# Patient Record
Sex: FEMALE | Race: WHITE | NOT HISPANIC OR LATINO | URBAN - METROPOLITAN AREA
[De-identification: names, ages, dates, MRNs, and addresses within clinical notes are randomized per-mention and may not be internally consistent; named-entity substitution may affect disease eponyms.]

---

## 2023-07-28 PROBLEM — L25.9 CONTACT DERMATITIS: Status: ACTIVE | Noted: 2023-07-28

## 2023-07-28 PROBLEM — R42 LIGHTHEADEDNESS: Status: ACTIVE | Noted: 2023-07-28

## 2023-07-28 PROBLEM — D64.9 ANEMIA: Status: ACTIVE | Noted: 2023-07-28

## 2023-07-28 PROBLEM — L60.9 LONGITUDINAL NAIL RIDGE: Status: ACTIVE | Noted: 2023-07-28

## 2023-07-28 PROBLEM — M26.609 TMJ DYSFUNCTION: Status: ACTIVE | Noted: 2023-07-28

## 2023-07-28 PROBLEM — H66.90 OTITIS MEDIA: Status: ACTIVE | Noted: 2023-07-28

## 2023-07-28 PROBLEM — R21 SKIN RASH: Status: ACTIVE | Noted: 2023-07-28

## 2023-07-28 PROBLEM — R00.0 TACHYCARDIA: Status: ACTIVE | Noted: 2023-07-28

## 2023-07-28 PROBLEM — R42 VERTIGO: Status: ACTIVE | Noted: 2023-07-28

## 2023-07-28 PROBLEM — J11.1 INFLUENZA: Status: ACTIVE | Noted: 2023-07-28

## 2023-07-28 PROBLEM — R51.9 HEADACHE: Status: ACTIVE | Noted: 2023-07-28

## 2023-07-28 PROBLEM — H92.01 EAR PAIN, RIGHT: Status: ACTIVE | Noted: 2023-07-28

## 2023-07-28 PROBLEM — M25.551 HIP PAIN, RIGHT: Status: ACTIVE | Noted: 2023-07-28

## 2023-07-28 PROBLEM — R07.89 CHEST PRESSURE: Status: ACTIVE | Noted: 2023-07-28

## 2023-07-28 PROBLEM — B02.9 SHINGLES: Status: ACTIVE | Noted: 2023-07-28

## 2023-07-28 PROBLEM — N62 MACROMASTIA: Status: ACTIVE | Noted: 2023-07-28

## 2023-07-28 PROBLEM — M54.16 LUMBAR RADICULOPATHY: Status: ACTIVE | Noted: 2023-07-28

## 2023-07-28 PROBLEM — I10 BENIGN ESSENTIAL HYPERTENSION: Status: ACTIVE | Noted: 2023-07-28

## 2023-07-28 PROBLEM — Z86.19 HISTORY OF SHINGLES: Status: ACTIVE | Noted: 2023-07-28

## 2023-07-28 PROBLEM — J30.9 ALLERGIC RHINITIS: Status: ACTIVE | Noted: 2023-07-28

## 2023-07-28 PROBLEM — S29.019A STRAIN OF THORACIC REGION: Status: ACTIVE | Noted: 2023-07-28

## 2023-07-28 PROBLEM — R10.9 ABDOMINAL PAIN: Status: ACTIVE | Noted: 2023-07-28

## 2023-07-28 PROBLEM — G43.909 MIGRAINE, UNSPECIFIED, NOT INTRACTABLE, WITHOUT STATUS MIGRAINOSUS: Status: ACTIVE | Noted: 2023-07-28

## 2023-07-28 PROBLEM — K21.9 GERD (GASTROESOPHAGEAL REFLUX DISEASE): Status: ACTIVE | Noted: 2023-07-28

## 2023-07-28 PROBLEM — R53.83 FATIGUE: Status: ACTIVE | Noted: 2023-07-28

## 2023-07-28 PROBLEM — L91.8 SKIN TAG: Status: ACTIVE | Noted: 2023-07-28

## 2023-07-28 PROBLEM — J01.90 ACUTE SINUSITIS: Status: ACTIVE | Noted: 2023-07-28

## 2023-07-28 RX ORDER — LANSOPRAZOLE 30 MG/1
1 CAPSULE, DELAYED RELEASE ORAL 2 TIMES DAILY
COMMUNITY
Start: 2014-06-13 | End: 2023-07-31 | Stop reason: SDUPTHER

## 2023-07-28 RX ORDER — IBUPROFEN 600 MG/1
600 TABLET ORAL EVERY 4 HOURS PRN
COMMUNITY
Start: 2015-01-27 | End: 2023-10-16 | Stop reason: ALTCHOICE

## 2023-07-28 RX ORDER — GLUCOSAMINE/CHONDR SU A SOD 750-600 MG
1 TABLET ORAL DAILY
COMMUNITY

## 2023-07-31 ENCOUNTER — LAB (OUTPATIENT)
Dept: LAB | Facility: LAB | Age: 53
End: 2023-07-31
Payer: COMMERCIAL

## 2023-07-31 ENCOUNTER — OFFICE VISIT (OUTPATIENT)
Dept: PRIMARY CARE | Facility: CLINIC | Age: 53
End: 2023-07-31
Payer: COMMERCIAL

## 2023-07-31 VITALS
HEART RATE: 74 BPM | TEMPERATURE: 97.4 F | WEIGHT: 281 LBS | DIASTOLIC BLOOD PRESSURE: 84 MMHG | OXYGEN SATURATION: 96 % | BODY MASS INDEX: 44.68 KG/M2 | SYSTOLIC BLOOD PRESSURE: 133 MMHG

## 2023-07-31 DIAGNOSIS — Z00.00 PHYSICAL EXAM: Primary | ICD-10-CM

## 2023-07-31 DIAGNOSIS — J30.1 SEASONAL ALLERGIC RHINITIS DUE TO POLLEN: ICD-10-CM

## 2023-07-31 DIAGNOSIS — R42 VERTIGO: ICD-10-CM

## 2023-07-31 DIAGNOSIS — K21.9 GASTROESOPHAGEAL REFLUX DISEASE WITHOUT ESOPHAGITIS: ICD-10-CM

## 2023-07-31 DIAGNOSIS — S29.019S STRAIN OF THORACIC REGION, SEQUELA: ICD-10-CM

## 2023-07-31 DIAGNOSIS — E55.9 VITAMIN D DEFICIENCY: ICD-10-CM

## 2023-07-31 DIAGNOSIS — Z00.00 PHYSICAL EXAM: ICD-10-CM

## 2023-07-31 DIAGNOSIS — I10 BENIGN ESSENTIAL HYPERTENSION: ICD-10-CM

## 2023-07-31 DIAGNOSIS — N62 MACROMASTIA: ICD-10-CM

## 2023-07-31 LAB
ALANINE AMINOTRANSFERASE (SGPT) (U/L) IN SER/PLAS: 23 U/L (ref 7–45)
ALBUMIN (G/DL) IN SER/PLAS: 4.3 G/DL (ref 3.4–5)
ALKALINE PHOSPHATASE (U/L) IN SER/PLAS: 68 U/L (ref 33–110)
ANION GAP IN SER/PLAS: 11 MMOL/L (ref 10–20)
ASPARTATE AMINOTRANSFERASE (SGOT) (U/L) IN SER/PLAS: 19 U/L (ref 9–39)
BASOPHILS (10*3/UL) IN BLOOD BY AUTOMATED COUNT: 0.01 X10E9/L (ref 0–0.1)
BASOPHILS/100 LEUKOCYTES IN BLOOD BY AUTOMATED COUNT: 0.2 % (ref 0–2)
BILIRUBIN TOTAL (MG/DL) IN SER/PLAS: 0.7 MG/DL (ref 0–1.2)
CALCIDIOL (25 OH VITAMIN D3) (NG/ML) IN SER/PLAS: 60 NG/ML
CALCIUM (MG/DL) IN SER/PLAS: 9 MG/DL (ref 8.6–10.6)
CARBON DIOXIDE, TOTAL (MMOL/L) IN SER/PLAS: 30 MMOL/L (ref 21–32)
CHLORIDE (MMOL/L) IN SER/PLAS: 103 MMOL/L (ref 98–107)
CHOLESTEROL (MG/DL) IN SER/PLAS: 167 MG/DL (ref 0–199)
CHOLESTEROL IN HDL (MG/DL) IN SER/PLAS: 57.2 MG/DL
CHOLESTEROL/HDL RATIO: 2.9
CREATININE (MG/DL) IN SER/PLAS: 0.71 MG/DL (ref 0.5–1.05)
EOSINOPHILS (10*3/UL) IN BLOOD BY AUTOMATED COUNT: 0.21 X10E9/L (ref 0–0.7)
EOSINOPHILS/100 LEUKOCYTES IN BLOOD BY AUTOMATED COUNT: 3.8 % (ref 0–6)
ERYTHROCYTE DISTRIBUTION WIDTH (RATIO) BY AUTOMATED COUNT: 12.1 % (ref 11.5–14.5)
ERYTHROCYTE MEAN CORPUSCULAR HEMOGLOBIN CONCENTRATION (G/DL) BY AUTOMATED: 32.4 G/DL (ref 32–36)
ERYTHROCYTE MEAN CORPUSCULAR VOLUME (FL) BY AUTOMATED COUNT: 89 FL (ref 80–100)
ERYTHROCYTES (10*6/UL) IN BLOOD BY AUTOMATED COUNT: 4.6 X10E12/L (ref 4–5.2)
GFR FEMALE: >90 ML/MIN/1.73M2
GLUCOSE (MG/DL) IN SER/PLAS: 90 MG/DL (ref 74–99)
HEMATOCRIT (%) IN BLOOD BY AUTOMATED COUNT: 41 % (ref 36–46)
HEMOGLOBIN (G/DL) IN BLOOD: 13.3 G/DL (ref 12–16)
IMMATURE GRANULOCYTES/100 LEUKOCYTES IN BLOOD BY AUTOMATED COUNT: 0.2 % (ref 0–0.9)
LDL: 97 MG/DL (ref 0–99)
LEUKOCYTES (10*3/UL) IN BLOOD BY AUTOMATED COUNT: 5.5 X10E9/L (ref 4.4–11.3)
LYMPHOCYTES (10*3/UL) IN BLOOD BY AUTOMATED COUNT: 1.42 X10E9/L (ref 1.2–4.8)
LYMPHOCYTES/100 LEUKOCYTES IN BLOOD BY AUTOMATED COUNT: 25.9 % (ref 13–44)
MONOCYTES (10*3/UL) IN BLOOD BY AUTOMATED COUNT: 0.41 X10E9/L (ref 0.1–1)
MONOCYTES/100 LEUKOCYTES IN BLOOD BY AUTOMATED COUNT: 7.5 % (ref 2–10)
NEUTROPHILS (10*3/UL) IN BLOOD BY AUTOMATED COUNT: 3.42 X10E9/L (ref 1.2–7.7)
NEUTROPHILS/100 LEUKOCYTES IN BLOOD BY AUTOMATED COUNT: 62.4 % (ref 40–80)
NRBC (PER 100 WBCS) BY AUTOMATED COUNT: 0 /100 WBC (ref 0–0)
PLATELETS (10*3/UL) IN BLOOD AUTOMATED COUNT: 228 X10E9/L (ref 150–450)
POTASSIUM (MMOL/L) IN SER/PLAS: 4.3 MMOL/L (ref 3.5–5.3)
PROTEIN TOTAL: 6.2 G/DL (ref 6.4–8.2)
SODIUM (MMOL/L) IN SER/PLAS: 140 MMOL/L (ref 136–145)
THYROTROPIN (MIU/L) IN SER/PLAS BY DETECTION LIMIT <= 0.05 MIU/L: 1.62 MIU/L (ref 0.44–3.98)
TRIGLYCERIDE (MG/DL) IN SER/PLAS: 62 MG/DL (ref 0–149)
UREA NITROGEN (MG/DL) IN SER/PLAS: 13 MG/DL (ref 6–23)
VLDL: 12 MG/DL (ref 0–40)

## 2023-07-31 PROCEDURE — 80061 LIPID PANEL: CPT

## 2023-07-31 PROCEDURE — 1036F TOBACCO NON-USER: CPT | Performed by: FAMILY MEDICINE

## 2023-07-31 PROCEDURE — 84443 ASSAY THYROID STIM HORMONE: CPT

## 2023-07-31 PROCEDURE — 99213 OFFICE O/P EST LOW 20 MIN: CPT | Performed by: FAMILY MEDICINE

## 2023-07-31 PROCEDURE — 80053 COMPREHEN METABOLIC PANEL: CPT

## 2023-07-31 PROCEDURE — 3075F SYST BP GE 130 - 139MM HG: CPT | Performed by: FAMILY MEDICINE

## 2023-07-31 PROCEDURE — 85025 COMPLETE CBC W/AUTO DIFF WBC: CPT

## 2023-07-31 PROCEDURE — 99396 PREV VISIT EST AGE 40-64: CPT | Performed by: FAMILY MEDICINE

## 2023-07-31 PROCEDURE — 82306 VITAMIN D 25 HYDROXY: CPT

## 2023-07-31 PROCEDURE — 3079F DIAST BP 80-89 MM HG: CPT | Performed by: FAMILY MEDICINE

## 2023-07-31 PROCEDURE — 36415 COLL VENOUS BLD VENIPUNCTURE: CPT

## 2023-07-31 RX ORDER — LANSOPRAZOLE 30 MG/1
30 CAPSULE, DELAYED RELEASE ORAL 2 TIMES DAILY
Qty: 180 CAPSULE | Refills: 1 | Status: SHIPPED | OUTPATIENT
Start: 2023-07-31 | End: 2024-01-27

## 2023-07-31 ASSESSMENT — PATIENT HEALTH QUESTIONNAIRE - PHQ9
2. FEELING DOWN, DEPRESSED OR HOPELESS: NOT AT ALL
SUM OF ALL RESPONSES TO PHQ9 QUESTIONS 1 & 2: 0
1. LITTLE INTEREST OR PLEASURE IN DOING THINGS: NOT AT ALL

## 2023-07-31 ASSESSMENT — LIFESTYLE VARIABLES
SKIP TO QUESTIONS 9-10: 0
HOW MANY STANDARD DRINKS CONTAINING ALCOHOL DO YOU HAVE ON A TYPICAL DAY: 1 OR 2
AUDIT-C TOTAL SCORE: 1
HOW OFTEN DO YOU HAVE SIX OR MORE DRINKS ON ONE OCCASION: LESS THAN MONTHLY
HOW OFTEN DO YOU HAVE A DRINK CONTAINING ALCOHOL: NEVER

## 2023-07-31 ASSESSMENT — ENCOUNTER SYMPTOMS
OCCASIONAL FEELINGS OF UNSTEADINESS: 0
LOSS OF SENSATION IN FEET: 0
DEPRESSION: 0

## 2023-07-31 NOTE — PROGRESS NOTES
Subjective   Patient ID: Brittany Fletcher is a 53 y.o. female who presents for Annual Exam (Here for a physical. Hot flashes).    HPI pt is here for CPE    Having hot flashes.  Has had for 6-8 weeks, had in the past and went away.  EFREN 8 years ago.  Sees Dr Price.  Occasionally wakes pt at night.      2 weeks ago got up from floor doing sit ups and room spinning. Daughter gave her orange and crackers and water and felt a little better.  Had walked that morning.  Vertigo now if turns over in bed or if turns head quickly.  No hearing loss.  Some nausea when happens, can control w movement.  Seasonal allergies acting up, Zyrtec daily.  Not using her steroid ns right now.  Discussed sending her to ENT for work up, wants to try daily steroid ns and if nb will call and will refer    Walks daily.  Trying to lose weight but difficult. Saw surgeon and wants breast reduction due to chronic back pain from breasts and surgeon wants pt down to 230#.  Doing due to constant back pain, not getting better    Needs PPI refilled, takes daily or gerd returns        Review of Systems    Objective   /84   Pulse 74   Temp 36.3 °C (97.4 °F)   Wt 127 kg (281 lb)   SpO2 96%   BMI 44.68 kg/m²     Physical Exam  Constitutional:       Appearance: Normal appearance.   HENT:      Head: Normocephalic.      Right Ear: Tympanic membrane normal.      Left Ear: Tympanic membrane normal.      Nose: Nose normal.      Mouth/Throat:      Comments: PND only, no erythema  Eyes:      Extraocular Movements: Extraocular movements intact.      Conjunctiva/sclera: Conjunctivae normal.      Pupils: Pupils are equal, round, and reactive to light.   Neck:      Vascular: No carotid bruit.   Cardiovascular:      Rate and Rhythm: Normal rate and regular rhythm.      Pulses: Normal pulses.      Heart sounds: Normal heart sounds.   Pulmonary:      Effort: Pulmonary effort is normal.      Breath sounds: Normal breath sounds.   Abdominal:      General: Bowel  sounds are normal.      Palpations: Abdomen is soft. There is no mass.      Tenderness: There is no abdominal tenderness.   Musculoskeletal:         General: Normal range of motion.      Cervical back: Normal range of motion and neck supple.      Right lower leg: No edema.      Left lower leg: No edema.   Skin:     General: Skin is warm and dry.   Neurological:      General: No focal deficit present.      Mental Status: She is alert and oriented to person, place, and time.   Psychiatric:         Mood and Affect: Mood normal.         Thought Content: Thought content normal.         Judgment: Judgment normal.         Assessment/Plan   Problem List Items Addressed This Visit       Allergic rhinitis    Benign essential hypertension    GERD (gastroesophageal reflux disease)    Relevant Medications    lansoprazole (Prevacid) 30 mg DR capsule    Macromastia    Strain of thoracic region    Vertigo     Other Visit Diagnoses       Physical exam    -  Primary    Relevant Orders    CBC and Auto Differential (Completed)    Comprehensive Metabolic Panel (Completed)    Lipid Panel (Completed)    TSH with reflex to Free T4 if abnormal (Completed)    Vitamin D deficiency        Relevant Orders    Vitamin D, Total (Completed)        Refilled patient's medication, PPI, no changes made.  Discussed antireflux precautions also.    Discussed exercise and weight loss to help patient reach her goals so she can get breast reduction surgery.    Ordered labs for patient's physical exam, patient will do fasting we will call with results.    Discussed hot flashes.  Discussed wearing cotton to bed using fans for circulation and bedroom.  Discussed trying Remifemin over-the-counter for 6 to 8 weeks to see if this helps.  Can also try Effexor XR low-dose and may help with her symptoms.  She will let me know.  She is also due to see Dr. Price/GYN and will discuss further with him.  She is due for mammogram and Dr. Price and generally orders this  for her.    Discussed vertigo, patient does not wish to undergo work-up at this time since she feels symptoms are getting better.  Advised her to use steroid nasal spray daily with her Zyrtec to control allergy symptoms.  If vertigo is not resolved in the next 2 to 3 weeks she will call and I will refer her to ENT.  Discussed may need vestibular therapy or other intervention if is not resolving on her own.  She will call sooner if symptoms worsen or change.    Otherwise follow-up in 6 to 12 months or sooner as needed

## 2023-10-16 ENCOUNTER — OFFICE VISIT (OUTPATIENT)
Dept: PRIMARY CARE | Facility: CLINIC | Age: 53
End: 2023-10-16
Payer: COMMERCIAL

## 2023-10-16 ENCOUNTER — ANCILLARY PROCEDURE (OUTPATIENT)
Dept: RADIOLOGY | Facility: CLINIC | Age: 53
End: 2023-10-16
Payer: COMMERCIAL

## 2023-10-16 VITALS
BODY MASS INDEX: 44.42 KG/M2 | HEART RATE: 81 BPM | OXYGEN SATURATION: 96 % | TEMPERATURE: 97.6 F | SYSTOLIC BLOOD PRESSURE: 135 MMHG | WEIGHT: 283 LBS | HEIGHT: 67 IN | DIASTOLIC BLOOD PRESSURE: 84 MMHG

## 2023-10-16 DIAGNOSIS — M54.41 ACUTE RIGHT-SIDED LOW BACK PAIN WITH RIGHT-SIDED SCIATICA: Primary | ICD-10-CM

## 2023-10-16 DIAGNOSIS — M54.41 ACUTE RIGHT-SIDED LOW BACK PAIN WITH RIGHT-SIDED SCIATICA: ICD-10-CM

## 2023-10-16 PROCEDURE — 99214 OFFICE O/P EST MOD 30 MIN: CPT | Performed by: FAMILY MEDICINE

## 2023-10-16 PROCEDURE — 3079F DIAST BP 80-89 MM HG: CPT | Performed by: FAMILY MEDICINE

## 2023-10-16 PROCEDURE — 72110 X-RAY EXAM L-2 SPINE 4/>VWS: CPT | Mod: FY

## 2023-10-16 PROCEDURE — 72110 X-RAY EXAM L-2 SPINE 4/>VWS: CPT | Performed by: RADIOLOGY

## 2023-10-16 PROCEDURE — 3075F SYST BP GE 130 - 139MM HG: CPT | Performed by: FAMILY MEDICINE

## 2023-10-16 PROCEDURE — 1036F TOBACCO NON-USER: CPT | Performed by: FAMILY MEDICINE

## 2023-10-16 RX ORDER — MELOXICAM 15 MG/1
15 TABLET ORAL DAILY
Qty: 30 TABLET | Refills: 0 | Status: SHIPPED | OUTPATIENT
Start: 2023-10-16 | End: 2023-11-15

## 2023-10-16 RX ORDER — CYCLOBENZAPRINE HCL 10 MG
10 TABLET ORAL DAILY PRN
Qty: 30 TABLET | Refills: 0 | Status: SHIPPED | OUTPATIENT
Start: 2023-10-16 | End: 2024-05-03 | Stop reason: WASHOUT

## 2023-10-16 ASSESSMENT — LIFESTYLE VARIABLES
HOW OFTEN DO YOU HAVE SIX OR MORE DRINKS ON ONE OCCASION: LESS THAN MONTHLY
HOW MANY STANDARD DRINKS CONTAINING ALCOHOL DO YOU HAVE ON A TYPICAL DAY: 1 OR 2
SKIP TO QUESTIONS 9-10: 0
AUDIT-C TOTAL SCORE: 2
HOW OFTEN DO YOU HAVE A DRINK CONTAINING ALCOHOL: MONTHLY OR LESS

## 2023-10-16 ASSESSMENT — PATIENT HEALTH QUESTIONNAIRE - PHQ9
SUM OF ALL RESPONSES TO PHQ9 QUESTIONS 1 & 2: 0
2. FEELING DOWN, DEPRESSED OR HOPELESS: NOT AT ALL
1. LITTLE INTEREST OR PLEASURE IN DOING THINGS: NOT AT ALL

## 2023-10-16 ASSESSMENT — ENCOUNTER SYMPTOMS
LOSS OF SENSATION IN FEET: 0
DEPRESSION: 0
OCCASIONAL FEELINGS OF UNSTEADINESS: 0

## 2023-10-16 NOTE — PROGRESS NOTES
"Subjective   Patient ID: Brittany Fletcher is a 53 y.o. female who presents for Back Pain (Back pain radiating from to front to back  of legs. Upper back pain 1 week ).    HPI pt has low back pain on R into R gluteal area and radiates around her R hip down the front of her leg to mid tibial area.  Pain wakes her up at night.  Has had 1 week.  No injury or new activity.  No N/T or bowel or bladder problems.  Never had before, has pain in upper back off and on for years.      Has been using Biofreeze, Lidocaine patch, alt heat and ice, hot tub etc.  Taking 2 Aleve q am and 2 motrin at night.  Pain not going away    Had xray LS spine 2/2019 with mild narrowing of L3 and L4 discs and facet arthrosis L4-L5 bilaterally    She had been walking daily for exercise prior to this starting, now more difficult    Review of Systems    Objective   /84   Pulse 81   Temp 36.4 °C (97.6 °F)   Ht 1.702 m (5' 7\")   Wt 128 kg (283 lb)   SpO2 96%   BMI 44.32 kg/m²     Physical Exam  Cardiovascular:      Rate and Rhythm: Normal rate and regular rhythm.      Pulses: Normal pulses.      Heart sounds: Normal heart sounds.   Pulmonary:      Effort: Pulmonary effort is normal.      Breath sounds: Normal breath sounds.   Musculoskeletal:      Cervical back: Normal range of motion.      Right lower leg: No edema.      Left lower leg: No edema.      Comments: Decreased flexion LS spine.  Sacrum L posterior, PVM w palpable spasm and tenderness.  Straight leg raising + on R.  No sensory or vasc deficit in lower extremities.     Skin:     General: Skin is warm and dry.   Neurological:      Mental Status: She is alert and oriented to person, place, and time.   Psychiatric:         Mood and Affect: Mood normal.         Thought Content: Thought content normal.         Judgment: Judgment normal.         Assessment/Plan   Problem List Items Addressed This Visit    None  Visit Diagnoses         Codes    Acute right-sided low back pain with " right-sided sciatica    -  Primary M54.41    Relevant Medications    cyclobenzaprine (Flexeril) 10 mg tablet    meloxicam (Mobic) 15 mg tablet    Other Relevant Orders    XR lumbar spine complete 4+ views        Will xray LS spine and call w results.  Pending xray results will refer to PT.  Discussed if PT not resolving s/s would need to get mri to assess further.    Will stop otc nsaids and start meloxicam 15 mg w meal and cyclobenzarine 10 mg 1/2 to 1 tab at bed time.  SE profile and usage of both meds discussed in detail

## 2023-10-26 DIAGNOSIS — M54.41 ACUTE RIGHT-SIDED LOW BACK PAIN WITH RIGHT-SIDED SCIATICA: Primary | ICD-10-CM

## 2023-11-27 ENCOUNTER — OFFICE VISIT (OUTPATIENT)
Dept: OBSTETRICS AND GYNECOLOGY | Facility: CLINIC | Age: 53
End: 2023-11-27
Payer: COMMERCIAL

## 2023-11-27 ENCOUNTER — EVALUATION (OUTPATIENT)
Dept: PHYSICAL THERAPY | Facility: CLINIC | Age: 53
End: 2023-11-27
Payer: COMMERCIAL

## 2023-11-27 VITALS
DIASTOLIC BLOOD PRESSURE: 82 MMHG | HEIGHT: 61 IN | WEIGHT: 281 LBS | BODY MASS INDEX: 53.05 KG/M2 | SYSTOLIC BLOOD PRESSURE: 130 MMHG

## 2023-11-27 DIAGNOSIS — Z12.31 VISIT FOR SCREENING MAMMOGRAM: ICD-10-CM

## 2023-11-27 DIAGNOSIS — Z01.419 ENCOUNTER FOR ANNUAL ROUTINE GYNECOLOGICAL EXAMINATION: Primary | ICD-10-CM

## 2023-11-27 DIAGNOSIS — M54.41 ACUTE RIGHT-SIDED LOW BACK PAIN WITH RIGHT-SIDED SCIATICA: Primary | ICD-10-CM

## 2023-11-27 LAB
POC BLOOD, URINE: NEGATIVE
POC GLUCOSE, URINE: NEGATIVE MG/DL
POC KETONES, URINE: NEGATIVE MG/DL
POC LEUKOCYTES, URINE: NEGATIVE
POC NITRITE,URINE: NEGATIVE
POC PROTEIN, URINE: NEGATIVE MG/DL

## 2023-11-27 PROCEDURE — 3075F SYST BP GE 130 - 139MM HG: CPT | Performed by: OBSTETRICS & GYNECOLOGY

## 2023-11-27 PROCEDURE — 99396 PREV VISIT EST AGE 40-64: CPT | Performed by: OBSTETRICS & GYNECOLOGY

## 2023-11-27 PROCEDURE — 97110 THERAPEUTIC EXERCISES: CPT | Mod: GP | Performed by: PHYSICAL THERAPIST

## 2023-11-27 PROCEDURE — 1036F TOBACCO NON-USER: CPT | Performed by: OBSTETRICS & GYNECOLOGY

## 2023-11-27 PROCEDURE — 81003 URINALYSIS AUTO W/O SCOPE: CPT | Performed by: OBSTETRICS & GYNECOLOGY

## 2023-11-27 PROCEDURE — 3079F DIAST BP 80-89 MM HG: CPT | Performed by: OBSTETRICS & GYNECOLOGY

## 2023-11-27 PROCEDURE — 97161 PT EVAL LOW COMPLEX 20 MIN: CPT | Mod: GP | Performed by: PHYSICAL THERAPIST

## 2023-11-27 ASSESSMENT — ENCOUNTER SYMPTOMS
LOSS OF SENSATION IN FEET: 0
ALLERGIC/IMMUNOLOGIC NEGATIVE: 0
HEMATOLOGIC/LYMPHATIC NEGATIVE: 0
PSYCHIATRIC NEGATIVE: 0
GASTROINTESTINAL NEGATIVE: 0
DEPRESSION: 0
RESPIRATORY NEGATIVE: 0
OCCASIONAL FEELINGS OF UNSTEADINESS: 0
CONSTITUTIONAL NEGATIVE: 0
LOSS OF SENSATION IN FEET: 0
DEPRESSION: 0
NEUROLOGICAL NEGATIVE: 0
EYES NEGATIVE: 0
CARDIOVASCULAR NEGATIVE: 0
OCCASIONAL FEELINGS OF UNSTEADINESS: 0
MUSCULOSKELETAL NEGATIVE: 0
ENDOCRINE NEGATIVE: 0

## 2023-11-27 ASSESSMENT — PATIENT HEALTH QUESTIONNAIRE - PHQ9
1. LITTLE INTEREST OR PLEASURE IN DOING THINGS: NOT AT ALL
SUM OF ALL RESPONSES TO PHQ9 QUESTIONS 1 AND 2: 0
1. LITTLE INTEREST OR PLEASURE IN DOING THINGS: NOT AT ALL
2. FEELING DOWN, DEPRESSED OR HOPELESS: NOT AT ALL
2. FEELING DOWN, DEPRESSED OR HOPELESS: NOT AT ALL
SUM OF ALL RESPONSES TO PHQ9 QUESTIONS 1 AND 2: 0

## 2023-11-27 ASSESSMENT — PAIN SCALES - GENERAL: PAINLEVEL: 0-NO PAIN

## 2023-11-27 NOTE — PROGRESS NOTES
Subjective   Patient ID: Brittany Fletcher is a 53 y.o. female who presents for Gynecologic Exam (Annual exam, last pap 14 wnl HPV negative, last mammogram 21 benign, no chaperone needed).  Gynecologic Exam      Brittany is a 53-year-old female  3 para 2 well-known to the practice here for her annual exam    She had a robotic hysterectomy done .  Happy with those results.  Now with menopausal symptoms of hot flashes.  They come and go every few weeks.  Gets a few.  Occasionally interrupts her sleep but not a big problem.  Not looking for treatment at this time.    Denies any vaginal symptoms or dyspareunia.    Denies any urinary or bowel symptoms.  Review of Systems   All other systems reviewed and are negative.      Objective   Physical Exam  Thyroid: No thyroid megaly    Cardiovascular: Regular rate and rhythm    Lungs: Clear to auscultation    Breasts: No skin changes no masses palpated    Abdomen: Soft nontender bowel sounds positive no masses palpated    Extremities nontender no edema    Pelvic exam: External genitalia Bartholin's urethra and [place]'s are normal.  Vaginal exam shows no lesions or discharge.  Pelvic bimanual exam reveals no masses or tenderness.  Assessment/Plan   Normal annual exam.  Post hysterectomy and now postmenopausal  Mild symptoms that she does not desire treatment at this time.  Mammogram ordered  Pap smear is completed  Follow-up in 1 year or as needed

## 2023-11-27 NOTE — PROGRESS NOTES
Physical Therapy    Physical Therapy Evaluation     Patient Name: Brittany Fletcher  MRN: [id number]  Today's Date: 11/27/2023  Time Calculation  Start Time: 0945  Stop Time: 1028  Time Calculation (min): 43 min    Visit #: 1  Visits Approved: 20  Authorization needed: No  Insurance info: 25 COPAY, 750 DED, 2500 OOP MAX     Assessment:   Pt is a 53 y.o. female c/o R mid-low back pain that radiates to R posterior glute and wraps to anterior thigh stopping at R knee that started in October 2023. Pt presents with decreased BLE glute strength, decreased BLE flexibility, decreased SLS balance on RLE, limitations in lumbar AROM, and +TTP on R low back and glutes. These impairments are affecting her ability to lift > 10# at work and sleeping well. Pt is appropriate for skilled PT services to address these impairments and return to PLOF.    Plan:  OP PT Plan  Treatment/Interventions: Biofeedback, Dry needling, Education/ Instruction, Electrical stimulation, Gait training, Hot pack, Manual therapy, Mechanical traction, Neuromuscular re-education, Self care/ home management, Taping techniques, Therapeutic activities, Therapeutic exercises, Ultrasound, Vasopneumatic device  PT Plan: Skilled PT  PT Frequency: 2 times per week  Duration: 4 weeks (then 1x for 4 additional weeks PRN)  Rehab Potential: Excellent  Plan of Care Agreement: Patient    Goals:  Pt will achieve the following goals, in 8 weeks:  Pt will be independent in HEP to maximize PT benefits.   Pt will improve BLE glute max and med strength to 5/5 to improve functional mobility for squatting, walking, and stooping.   Pt will engage TA without cueing to improve core stability when lifting heavy objects at work.  Pt will improve JO score by 12% to demonstrate reduced disability.   Pt will improve average and worst pain rating by >/= 2 pts to improve QoL.  Pt will be able to sleep >/= 7 hrs pain free to improve restful sleep and productivity during the day.     Current  Problem:   1. Acute right-sided low back pain with right-sided sciatica  Referral to Physical Therapy    Follow Up In Physical Therapy          Subjective    Precautions:  No fall risk   Pain:  3/10 LBP  General:   Chief complaint: R sided mid to low back pain that radiates to R glute and anterior thigh (80% of the time) and stops at R knee. N/T intermittently.  HPI: Hx of mid to upper back pain and B knee pain. R knee pain has worsened since recent flare-up.   Description/Location: Spasm in back that won't stop at night. Intermittently sharp   Onset: October 2023   JALIL: Unknown   Pain: 3/10 current, 12/10 worst, 0/10 best   Exaggerating factors: prolonged sitting, any lifting (tries not to lift > 10#)  Relieving factors: ice, heat, lidocaine patches, biofreeze   PLOF: Keeps doing ADLs and anything at work, but feels she is moving slower   Sleep: Will wake her up in the middle of night, but can fall asleep without issues   Occupation: [employer]   Home setup (stairs, laundry): split level, 3-4 stairs to get to kitchen/laundry, 7-8 stairs to get to bedroom and bathroom. Modifies for laundry and requires some assistance from teenage children   Exercise routine: Walks dog everyday, some hip and arm strengthening, chair sit ups   Goals: Reduce pain and know how to manage episodes     Objective   Posture:  Forward head, B rounded shoulders, & increased lumbar lordosis     Neuro Screen:  Dermatomes: Intact to light touch B.    Lumbar AROM:   Flex: Min limitation d/t HS tightness   Ext: Min limitation  RSB: Min limitation  LSB: Min limitation    SLS:  R: asymmetrical pelvis drop initially but self corrected. Increased difficulty with balance on RLE. > 10 sec   L: symmetrical pelvis > 10 sec     Flexibility:  Hamstrings: R severe limitations, L mod-severe limitations   Hip ER: R mod limitations, L min limitations  Hip IR: R WNL, L WNL  Quads: R min limitations, L min limitations    Strength:   Hip  flex: R 4+/5 (painful R knee), L 5/5  Hip ABD: R 4-/5 (painful glute and low back), L 4/5  Hip ext: R 4-/5, L 4/5  Knee ext: R 5/5 (painful R knee), L 5/5  Knee flex: R 5/5, L 5/5  Ankle DF: R 5/5, L 5/5    Palpation:  +TTP: R glute max and med, greater trochanter, lateral thigh to knee, and R SIJ.    Directional Preference:  Repeated lumbar ext performed with prop on elbow and repeated prone press ups - no change in sx.     Passive accessory mvmts:  PA glide thoracic and lumbar spine hypomobile, but not painful    Isometric abdominal contraction:  Diaphragm compensation and holding breath in HL     Outcome Measures:  Other Measures  Oswestry Disablity Index (JO): 30%     Treatments:  1. Initial evaluation   2. Pt ed on diagnosis, prognosis, goals of PT, expectations, POC   3. HEP (see below) ed on normal vs abnormal response    Therapeutic Exercise:  Access Code: [email or web address]  URL: [email or web address]  Date: 11/27/2023  Prepared by: Rosa Palma    Exercises  - Seated Hamstring Stretch  - 2 x daily - 7 x weekly - 1 sets - 3 reps - 20 hold  - Hooklying Transversus Abdominis Palpation  - 2 x daily - 7 x weekly - 1 sets - 10 reps - 5 hold  - Supine Lower Trunk Rotation  - 2 x daily - 7 x weekly - 2 sets - 10 reps - 5 hold  - Supine Bridge  - 2 x daily - 7 x weekly - 2 sets - 10 reps - 5 hold    Education:  Pt educated on importance of core and hip strength for spinal stability to relieve back pain.

## 2023-11-30 ENCOUNTER — TREATMENT (OUTPATIENT)
Dept: PHYSICAL THERAPY | Facility: CLINIC | Age: 53
End: 2023-11-30
Payer: COMMERCIAL

## 2023-11-30 ENCOUNTER — ANCILLARY PROCEDURE (OUTPATIENT)
Dept: RADIOLOGY | Facility: CLINIC | Age: 53
End: 2023-11-30
Payer: COMMERCIAL

## 2023-11-30 VITALS — HEIGHT: 61 IN | BODY MASS INDEX: 52.86 KG/M2 | WEIGHT: 279.98 LBS

## 2023-11-30 DIAGNOSIS — M54.41 ACUTE RIGHT-SIDED LOW BACK PAIN WITH RIGHT-SIDED SCIATICA: ICD-10-CM

## 2023-11-30 DIAGNOSIS — Z12.31 VISIT FOR SCREENING MAMMOGRAM: ICD-10-CM

## 2023-11-30 PROCEDURE — 97110 THERAPEUTIC EXERCISES: CPT | Mod: GP | Performed by: PHYSICAL THERAPIST

## 2023-11-30 PROCEDURE — 77067 SCR MAMMO BI INCL CAD: CPT | Performed by: RADIOLOGY

## 2023-11-30 PROCEDURE — 77063 BREAST TOMOSYNTHESIS BI: CPT | Performed by: RADIOLOGY

## 2023-11-30 PROCEDURE — 77067 SCR MAMMO BI INCL CAD: CPT

## 2023-11-30 ASSESSMENT — PAIN SCALES - GENERAL: PAINLEVEL_OUTOF10: 5 - MODERATE PAIN

## 2023-11-30 ASSESSMENT — PAIN - FUNCTIONAL ASSESSMENT: PAIN_FUNCTIONAL_ASSESSMENT: 0-10

## 2023-11-30 NOTE — PROGRESS NOTES
"Physical Therapy    Physical Therapy Treatment    Patient Name: Brittany Fletcher  MRN: [id number]  Today's Date: 11/30/2023  Time in/out:2:30-3:10     Visit #: 2  Visits Approved: 20  Authorization needed: No  Insurance info: 25 COPAY, 750 DED, 2500 OOP MAX        Assessment:  Pt required verbal cues for maintaining TA activation w/ new ex and to avoid holding her breath.Pt stephan new stretches w/o difficulty or inc pain. Pt experienced slight dec in radicular symptoms in R LE w/ SKTC ex.     Plan:  Cont w/ DLS exs        Current Problem  1. Acute right-sided low back pain with right-sided sciatica  Follow Up In Physical Therapy                Subjective      Pt states she has pain in mid and low back. Pt states pain shoots down R LE. Pt states she is doing exs at home.  Precautions   No fall risk     Pain    Pain Assessment  Pain Assessment: 0-10  Pain Score: 5 - Moderate pain  Pain Location: Back  Pain Orientation: Mid, Lower    Objective         Treatments:  Therapeutic Exercise  Therapeutic Exercise Performed:  (40 min)  Therapeutic Exercise Activity 1: Nustep L2x8'  Therapeutic Exercise Activity 2: TA 5\"x20  Therapeutic Exercise Activity 3: Supine HS str 30\"x3 B  Therapeutic Exercise Activity 4: LTR 5\"x10  Therapeutic Exercise Activity 5: SKTC 30\"x3 B  Therapeutic Exercise Activity 6: Piriformis str 30\"x3B  Therapeutic Exercise Activity 7: Bridge 2x10  Therapeutic Exercise Activity 8: TA w/ march x10            "

## 2023-12-07 ENCOUNTER — TREATMENT (OUTPATIENT)
Dept: PHYSICAL THERAPY | Facility: CLINIC | Age: 53
End: 2023-12-07
Payer: COMMERCIAL

## 2023-12-07 DIAGNOSIS — M54.41 ACUTE RIGHT-SIDED LOW BACK PAIN WITH RIGHT-SIDED SCIATICA: ICD-10-CM

## 2023-12-07 PROCEDURE — 97110 THERAPEUTIC EXERCISES: CPT | Mod: GP | Performed by: PHYSICAL THERAPIST

## 2023-12-07 NOTE — PROGRESS NOTES
"PHYSICAL THERAPY   TREATMENT NOTE    Patient Name:  Brittany Fletcher   Patient MRN: [id number]  Date: 12/07/23    Time Calculation  Start Time: 0835  Stop Time: 0920  Time Calculation (min): 45 min    Insurance:  Insurance Type: medical [facility], 25 COPAY, 750 DED, 2500 OOP MAX   Visit number: 3   Approved # of visits 20  Authorization Needed: no         General   Reason for visit:  Right mid to low back, right gluts, around to anterior hip and front of right thigh   Referred by: Costentino-Bressi, DO    Therapy diagnoses:   1. Acute right-sided low back pain with right-sided sciatica  Follow Up In Physical Therapy           Assessment:    Pt seems to respond better to repeated flexion vs extension today with pain radiating past knee following REIL. Pt has good extension ROM with REIL.  Able to palpate TA contraction and leaves treatment session feeling good.     Plan:  Continue core strength/stabilization, mechanical assessment as needed. Follow up to see if patient had any pain below knee after this session.     Subjective  Pt states stairs are a challenge due to pain going up the steps, modify to one step at a time.  Afraid knee will give out. After therapy last night had shooting pain down to her ankle with walking.  Lasted a couple hours. Doing HEP without any issues.  Can walk and stand to cook/chop, etc.  Lifting and stairs biggest issue.     - Pain (0-10): 5/10     Precautions  none  Fall Risk: no    Objective    Treatment Performed:   Therapeutic Exercise:  45 Minutes  - Nustep L4 x 8'  - prone lying with gentle manual ext mobs (3 minutes)  - CLAUDIA 2 minutes  - REIL 10x 2 WORSE pain below right knee  - RFIL 20x BETTER LEG  - TA isometric review  - HL TA BKFO 10x R/L  - HL TA marching 10x R/L  - Bridge 10x 5\" hold  - side clams 10x R/L with TA   - side SLR with TA 10x R/L  - Supine HS str 30\"x3 B  - SKTC 30\"x3 B  - Piriformis str 30\"x3B    Manual Therapy:   minutes    Neuromuscular Re-education:   " minutes    Gait Training:    minutes    Modalities: minutes

## 2023-12-12 ENCOUNTER — APPOINTMENT (OUTPATIENT)
Dept: PHYSICAL THERAPY | Facility: CLINIC | Age: 53
End: 2023-12-12
Payer: COMMERCIAL

## 2023-12-18 ENCOUNTER — TREATMENT (OUTPATIENT)
Dept: PHYSICAL THERAPY | Facility: CLINIC | Age: 53
End: 2023-12-18
Payer: COMMERCIAL

## 2023-12-18 DIAGNOSIS — M54.41 ACUTE RIGHT-SIDED LOW BACK PAIN WITH RIGHT-SIDED SCIATICA: ICD-10-CM

## 2023-12-18 PROCEDURE — 97110 THERAPEUTIC EXERCISES: CPT | Mod: GP | Performed by: PHYSICAL THERAPIST

## 2023-12-18 NOTE — PROGRESS NOTES
"PHYSICAL THERAPY   TREATMENT NOTE    Patient Name:  Brittany Fletcher   Patient MRN: [id number]  Date: 12/18/23    Time Calculation  Start Time: 1015  Stop Time: 1100  Time Calculation (min): 45 min    Insurance:  Insurance Type: medical [facility], 25 COPAY, 750 DED, 2500 OOP MAX   Visit number: 4   Approved # of visits 20  Authorization Needed: no         General   Reason for visit:  Right mid to low back, right gluts, around to anterior hip and front of right thigh   Referred by: Costentino-Bressi, DO    Therapy diagnoses:   1. Acute right-sided low back pain with right-sided sciatica  Follow Up In Physical Therapy           Assessment:    Pt does well with initiation of dynamic balance exercises and achieves good muscle fatigue with standing SLR 3 direction.  Added child's pose at bar to address upper/mid back pain.     Plan:   Progress core and hip strength/stabilization in neutral/flexion     Subjective  Pt states she is feeling pretty good but has been in bed since Wednesday due to sinus infection.  Did not walk dog like she normally does. Did not do any of her stretches because she slept a lot. Yesterday sas in shower and twisted wrong, felt some pain under shoulder blade on right side.      - Pain (0-10): 3/10     Precautions  none  Fall Risk: no    Objective    Treatment Performed:   Therapeutic Exercise:  45 Minutes  - Nustep L4 x 8'  - dynamic: marching, butt kick, soldier, open/close gate  - crabwalk RED 20'x2, zig zag RED 20' fwd/bwd, monster RED 20' fwd/bwd  - Sitting lumbar flexion stretch 10x  - HC slant board stretch 30\"x3  - SL TA 3 direction SLR standing to fatigue R/L  - Bridge 10x 5\" hold  - TA isometric review  - HL TA BKFO 10x R/L  - HL TA marching 10x R/L  - side clams 10x R/L with TA   - side SLR with TA 10x R/L  - DKTC 20x  - Piriformis str 30\"x3B  - child's pose at bar 30\"x3    Manual Therapy:   minutes    Neuromuscular Re-education:   minutes    Gait Training:    minutes    Modalities: " minutes

## 2023-12-21 ENCOUNTER — APPOINTMENT (OUTPATIENT)
Dept: PHYSICAL THERAPY | Facility: CLINIC | Age: 53
End: 2023-12-21
Payer: COMMERCIAL

## 2023-12-26 ENCOUNTER — TREATMENT (OUTPATIENT)
Dept: PHYSICAL THERAPY | Facility: CLINIC | Age: 53
End: 2023-12-26
Payer: COMMERCIAL

## 2023-12-26 DIAGNOSIS — M54.41 ACUTE RIGHT-SIDED LOW BACK PAIN WITH RIGHT-SIDED SCIATICA: ICD-10-CM

## 2023-12-26 PROCEDURE — 97110 THERAPEUTIC EXERCISES: CPT | Mod: GP | Performed by: PHYSICAL THERAPIST

## 2023-12-26 NOTE — PROGRESS NOTES
"PHYSICAL THERAPY   TREATMENT NOTE    Patient Name:  Brittany Fletcher   Patient MRN: [id number]  Date: 12/26/23    Time Calculation  Start Time: 1030  Stop Time: 1108  Time Calculation (min): 38 min    Insurance:  Insurance Type: medical [facility], 25 COPAY, 750 DED, 2500 OOP MAX   Visit number: 5   Approved # of visits 20  Authorization Needed: no         General   Reason for visit:  Right mid to low back, right gluts, around to anterior hip and front of right thigh   Referred by: Costentino-Bressi, DO Therapy diagnoses:   1. Acute right-sided low back pain with right-sided sciatica  Follow Up In Physical Therapy           Assessment:    Continued with same exercises from last visit as she wasn't able to perform them much at home due to 's injury and holidays. Pt does well with strength and stabilization exercises today with minimal cues needed for technique. Good balance with dynamic exercises.     Plan:   Progress HEP as appropriate     Subjective  Pt states she \"isn't feeling great\" because of how busy she has been.   fell and was in ER for 6 hours, currently NWB on one LE due to knee injury.  Pain has been okay but hasn't had time to take for herself to do her exercises.     - Pain (0-10): 2/10     Precautions  none  Fall Risk: no    Objective    Treatment Performed:   Therapeutic Exercise:  38 Minutes  - Nustep L4 x 8'  - dynamic: marching, butt kick, soldier, open/close gate  - [employer]walk GREEN 40'X 2, zig zag RED 20' fwd/bwd, monster RED 20' fwd/bwd  - Sitting lumbar flexion stretch 10x  - HC slant board stretch 30\"x3  - SL TA 3 direction SLR standing to fatigue R/L  - Bridge 10x 5\" hold  - HL TA BKFO 10x R/L  - HL TA marching 10x R/L  - side clams 10x R/L with TA   - side SLR with TA 10x R/L  - DKTC 20x  - Piriformis str 30\"x2 B    Education:  -which exercise to perform when in pain (sitting lumbar flexion and DKTC), press ups as needed as well  -strength exercises for long term " management    Manual Therapy:   minutes    Neuromuscular Re-education:   minutes    Gait Training:    minutes    Modalities: minutes

## 2023-12-28 ENCOUNTER — APPOINTMENT (OUTPATIENT)
Dept: PHYSICAL THERAPY | Facility: CLINIC | Age: 53
End: 2023-12-28
Payer: COMMERCIAL

## 2024-02-26 ENCOUNTER — DOCUMENTATION (OUTPATIENT)
Dept: PHYSICAL THERAPY | Facility: CLINIC | Age: 54
End: 2024-02-26
Payer: COMMERCIAL

## 2024-02-26 NOTE — PROGRESS NOTES
Physical Therapy    Discharge Summary    Name: Brittany Fletcher  MRN: [id number]  : 1970  Date: 24    Discharge Summary: PT    Discharge Information: Date of discharge 24, Date of last visit 23, Date of evaluation 23, Number of attended visits 5, Referred by PCP, and Referred for BACK PAIN      Discharge Status: unknown     Rehab Discharge Reason: Failed to schedule and/or keep follow-up appointment(s)

## 2024-03-18 ENCOUNTER — OFFICE VISIT (OUTPATIENT)
Dept: PLASTIC SURGERY | Facility: CLINIC | Age: 54
End: 2024-03-18
Payer: COMMERCIAL

## 2024-03-18 VITALS
DIASTOLIC BLOOD PRESSURE: 83 MMHG | HEART RATE: 86 BPM | HEIGHT: 67 IN | BODY MASS INDEX: 44.89 KG/M2 | SYSTOLIC BLOOD PRESSURE: 148 MMHG | WEIGHT: 286 LBS

## 2024-03-18 DIAGNOSIS — G89.29 CHRONIC BACK PAIN, UNSPECIFIED BACK LOCATION, UNSPECIFIED BACK PAIN LATERALITY: ICD-10-CM

## 2024-03-18 DIAGNOSIS — M54.9 CHRONIC BACK PAIN, UNSPECIFIED BACK LOCATION, UNSPECIFIED BACK PAIN LATERALITY: ICD-10-CM

## 2024-03-18 DIAGNOSIS — N62 MACROMASTIA: ICD-10-CM

## 2024-03-18 PROCEDURE — 3079F DIAST BP 80-89 MM HG: CPT | Performed by: PHYSICIAN ASSISTANT

## 2024-03-18 PROCEDURE — 3008F BODY MASS INDEX DOCD: CPT | Performed by: PHYSICIAN ASSISTANT

## 2024-03-18 PROCEDURE — 99204 OFFICE O/P NEW MOD 45 MIN: CPT | Performed by: PHYSICIAN ASSISTANT

## 2024-03-18 PROCEDURE — 3077F SYST BP >= 140 MM HG: CPT | Performed by: PHYSICIAN ASSISTANT

## 2024-03-18 PROCEDURE — 1036F TOBACCO NON-USER: CPT | Performed by: PHYSICIAN ASSISTANT

## 2024-03-18 NOTE — PROGRESS NOTES
Department of Plastic and Reconstructive Surgery           Initial Office Consultation    Date: 24  Referring Provider: Rosa Palma DPT  Primary Care Provider: DO Camden Erwin   Brittany Fletcher is a 53 y.o. female who was  rosa Palma DPT  for evaluation of chronic back pain.      She presents today to discuss ongoing back pain secondary to macromastia. 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 She works in the [employer] 3-5 days weekly for 8 hours a day. She notes she has had ongoing and recently worsening back pain. Her pain on a daily basis is a 2-3/10 baseline and a 10/10 at its worst. She has tried OTC pain medications including tylenol and ibuprofen for pain control without relief. She has to wear sports bras that are not supportive enough at work and this causes bra strap grooving and discomfort in her shoulders. Her current bra size is a 44G/H. She going for bi-monthly massages for help with her pain. She was recently in physical therapy for her back pain and completed 15 sessions without resolution of her pain. She additionally explains that she has rashes of her breast folds that her Ob/Gyn treats with topical antifungals. She feels her symptoms are contributed to her enlarged breasts.       PMH: DVT after   not on AC, GERD  Surgical Hx:  x2, hysterectomy, tonsillectomy  Family Hx: mother BCA  Smoking: former: quit       Review of Systems   All other systems have been reviewed with the patient and have been found to be negative with exception to the chief complaint as mentioned in the history of present illness.    ROS: As noted in history of present illness    - CONSTITUTIONAL: Denies weight loss, fever and chills.  - HEENT: Denies changes in vision and hearing.  - RESPIRATORY: Denies SOB and cough, difficulty breathing  - CV: Denies palpitations and CP  - GI: Denies abdominal pain, nausea, vomiting and  diarrhea.  - : Denies dysuria and urinary frequency.  - MSK: positive for back and shoulder pain  - SKIN: positive for skin rashes under the breast folds  - NEUROLOGICAL: Denies headache and syncope.    Objective   Vital Signs:   Vitals:    03/18/24 1324   BP: 148/83   Pulse: 86     Gen: interactive and pleasant  Head: NCAT  Eyes: EOMI, PERRLA  Mouth: MMM  Throat: trachea midline  Cor: RRR  Pulm: nonlabored breathing  Abd: s/nt/nd  Neuro: AAOx3  Ext: extremities perfused    Focused exam of the: breast    Trapezial hypertrophy noted with bra strap grooving.                                 R                  L  SN:NAC             43cm              41.5cm  NAC:IMF            20cm             21cm         BW                    16cm              16cm        NAC diam         7cm              8.5cm                                                                                                Ptosis Grade     3               3                                                       Bra Size         44 G/H    Body mass index is 44.79 kg/m².  Body surface area is 2.48 meters squared.    Imaging  Mammogram 11/20/23  IMPRESSION:  No mammographic evidence of malignancy.      BI-RADS CATEGORY:      BI-RADS Category:  1 Negative.      Assessment/Plan     Brittany Fletcher is a 53 y.o. female who was vladimir Palma DPT for evaluation of chronic back pain.      She did undoubtedly has macromastia which is symptomatic causing her pain, bra strap grooving, trapezial hypertrophy. She has completed approx 15 sessions of physical therapy without improvement or resolution of her symptoms. She is referred to us by her physical therapist for consultation regarding back pain secondary to macromastia. She has attempted OTC pain medications, bi-monthly massage, OTC pain medications including tylenol and ibuprofen, heat/ice for her pain without improvement. She has been treated for rashes with topical antifungals by her Ob/Gyn. Her Bmi at today  visit is 44.79 and her Body surface area is 2.48 meters squared. I dicussed with her weight loss for management of pain. She is interested in losing weight we will refer her for nutrition management and she should follow up with her PCP for possible medical assisted weight loss. She is agreeable with this plan. Her goal is BMI 35 or less.      Plan:   Referral for weight loss management and nutrition counseling  Patient to follow up after weight loss, goal weight BMI 35 or less, patient was given BMI chart  Will need clinical photos at follow up visit    I spent 45 minutes with this patient. Greater than 50% of this time was spent in the counselling and/or coordination of care of this patient.  This note was created using voice recognition software and was not corrected for typographical or grammatical errors.    Signature: Chanel Small PA-C  Date: 03/18/24

## 2024-05-03 ENCOUNTER — OFFICE VISIT (OUTPATIENT)
Dept: PRIMARY CARE | Facility: CLINIC | Age: 54
End: 2024-05-03
Payer: COMMERCIAL

## 2024-05-03 VITALS
DIASTOLIC BLOOD PRESSURE: 85 MMHG | TEMPERATURE: 97.6 F | RESPIRATION RATE: 18 BRPM | HEIGHT: 67 IN | WEIGHT: 276 LBS | HEART RATE: 72 BPM | SYSTOLIC BLOOD PRESSURE: 134 MMHG | BODY MASS INDEX: 43.32 KG/M2 | OXYGEN SATURATION: 93 %

## 2024-05-03 DIAGNOSIS — E66.01 CLASS 3 SEVERE OBESITY DUE TO EXCESS CALORIES WITH SERIOUS COMORBIDITY AND BODY MASS INDEX (BMI) OF 40.0 TO 44.9 IN ADULT (MULTI): Primary | ICD-10-CM

## 2024-05-03 DIAGNOSIS — N62 MACROMASTIA: ICD-10-CM

## 2024-05-03 DIAGNOSIS — Z00.00 PHYSICAL EXAM: ICD-10-CM

## 2024-05-03 DIAGNOSIS — E55.9 VITAMIN D DEFICIENCY: ICD-10-CM

## 2024-05-03 DIAGNOSIS — M25.551 HIP PAIN, RIGHT: ICD-10-CM

## 2024-05-03 DIAGNOSIS — K21.9 GASTROESOPHAGEAL REFLUX DISEASE WITHOUT ESOPHAGITIS: ICD-10-CM

## 2024-05-03 DIAGNOSIS — M54.16 LUMBAR RADICULOPATHY: ICD-10-CM

## 2024-05-03 DIAGNOSIS — R73.9 HYPERGLYCEMIA: ICD-10-CM

## 2024-05-03 PROCEDURE — 1036F TOBACCO NON-USER: CPT | Performed by: FAMILY MEDICINE

## 2024-05-03 PROCEDURE — 3008F BODY MASS INDEX DOCD: CPT | Performed by: FAMILY MEDICINE

## 2024-05-03 PROCEDURE — 99214 OFFICE O/P EST MOD 30 MIN: CPT | Performed by: FAMILY MEDICINE

## 2024-05-03 PROCEDURE — 3075F SYST BP GE 130 - 139MM HG: CPT | Performed by: FAMILY MEDICINE

## 2024-05-03 PROCEDURE — 3079F DIAST BP 80-89 MM HG: CPT | Performed by: FAMILY MEDICINE

## 2024-05-03 ASSESSMENT — PATIENT HEALTH QUESTIONNAIRE - PHQ9
SUM OF ALL RESPONSES TO PHQ9 QUESTIONS 1 AND 2: 0
2. FEELING DOWN, DEPRESSED OR HOPELESS: NOT AT ALL
1. LITTLE INTEREST OR PLEASURE IN DOING THINGS: NOT AT ALL

## 2024-05-03 ASSESSMENT — PAIN SCALES - GENERAL: PAINLEVEL: 6

## 2024-05-03 NOTE — PROGRESS NOTES
"Subjective   Patient ID: Brittany Fletcher is a 54 y.o. female who presents for DISCUSS MEDICATION and and toe.    HPI   The patient presents to the clinic to discuss weight management medication. She has past medical history of hypertension, tachycardia, allergic rhinitis, TMJ dysfunction, vertigo, GERD, macromastia, anemia, lumbar radiculopathy, migraines, and contact dermatitis.    The patient is accompanied by her sister in the clinic today.    The patient reports a lesion on the little toe of her right foot. She initially thinks that this lesion could be a bunion, it appears to be a callous or corn.  She will see podiatry for this.    The patient inquires about weight loss medication options (current weight: 276 lbs, down 10 lbs from 03/18/2024). She is interested in GLP-1 injections for weight loss. She is unsure about insurance coverage for GLP-1 injections at this time. The patient is eager to lose weight as she is considering a breast reduction (later this year or early next year) for treatment of marcomastia. She was advised by her plastic surgeon that she must lose ~40-50 lbs (goal BMI of 35 or less) before she may have this reduction procedure. She also wants to lose weight to alleviate symptoms of bilateral hip pain and right knee pain and low back pain.  She plans to start PT    She has maternal family history of diabetes mellitus (maternal great-grandfather, cousins on maternal side).    Review of Systems    Objective   /85   Pulse 72   Temp 36.4 °C (97.6 °F)   Resp 18   Ht 1.702 m (5' 7\")   Wt 125 kg (276 lb)   LMP 01/01/2015 Comment: hysterectomy  SpO2 93%   BMI 43.23 kg/m²     Physical Exam  Cardiovascular:      Rate and Rhythm: Normal rate and regular rhythm.      Pulses: Normal pulses.      Heart sounds: Normal heart sounds.   Pulmonary:      Effort: Pulmonary effort is normal.      Breath sounds: Normal breath sounds.   Musculoskeletal:         General: Normal range of motion.      " Cervical back: Normal range of motion.      Right lower leg: No edema.      Left lower leg: No edema.   Skin:     General: Skin is warm and dry.   Neurological:      Mental Status: She is alert and oriented to person, place, and time.   Psychiatric:         Mood and Affect: Mood normal.         Thought Content: Thought content normal.         Judgment: Judgment normal.         Assessment/Plan   Problem List Items Addressed This Visit             ICD-10-CM    GERD (gastroesophageal reflux disease) K21.9    Hip pain, right M25.551    Lumbar radiculopathy M54.16    Macromastia N62     Other Visit Diagnoses         Codes    Class 3 severe obesity due to excess calories with serious comorbidity and body mass index (BMI) of 40.0 to 44.9 in adult (Multi)    -  Primary E66.01, Z68.41    Hyperglycemia     R73.9    Relevant Orders    Hemoglobin A1C    Vitamin D deficiency     E55.9    Relevant Orders    Vitamin D 25-Hydroxy,Total (for eval of Vitamin D levels)    Physical exam     Z00.00    Relevant Orders    CBC and Auto Differential    Comprehensive Metabolic Panel    Lipid Panel    TSH with reflex to Free T4 if abnormal               Labs (CBC, CMP, A1C, lipid panel, TSH, vitamin D) were ordered for the patient to complete before her next visit. The patient was advised to complete these labs in May/June 2024 if she intends to start on a GLP-1 injection. Otherwise, she was advised to complete these labs closer to July 2024 when she is due for her annual cpe.     In regards to her inquiry about weight management medication options, the advantages/disadvantages (including side-effects) of GLP-1 injections (Wegovy, Mounjaro, etc) were discussed with the patient. She was informed about the effects of GLP-1 injections on controlling blood sugar levels which indirectly leads to more weight loss. She was advised about the possibility of re-gaining weight back after discontinuing the GLP-1 injection in the future. Consequently, she  was advised about the benefits of eating a healthy low-carbohydrate diet before, during, and after use of GLP-1 injections. The patient intends to consult with her insurance about coverage for GLP-1 injections. If she is able to obtain a GLP-1 injection, the patient was advised to contact the clinic to receive instruction on proper administration of the injection. For now, continue eating a healthy low-carbohydrate diet with regular exercise.    Reviewed other weight loss medications    In regards to concerns with a lesion on the little toe of her right foot, the patient was advised that this condition is likely a foot corn/callus. She was advised that this condition is not concerning but causes some discomfort so will se podiatry.     Pt will follow up next 1-3 months    Scribe Attestation  By signing my name below, I, Randal Kuhn , Jessa   attest that this documentation has been prepared under the direction and in the presence of Rose Dash DO.

## 2024-05-04 ENCOUNTER — LAB (OUTPATIENT)
Dept: LAB | Facility: LAB | Age: 54
End: 2024-05-04
Payer: COMMERCIAL

## 2024-05-04 DIAGNOSIS — Z00.00 PHYSICAL EXAM: ICD-10-CM

## 2024-05-04 DIAGNOSIS — R73.9 HYPERGLYCEMIA: ICD-10-CM

## 2024-05-04 DIAGNOSIS — E55.9 VITAMIN D DEFICIENCY: ICD-10-CM

## 2024-05-04 LAB
BASOPHILS # BLD AUTO: 0.01 X10*3/UL (ref 0–0.1)
BASOPHILS NFR BLD AUTO: 0.3 %
EOSINOPHIL # BLD AUTO: 0.15 X10*3/UL (ref 0–0.7)
EOSINOPHIL NFR BLD AUTO: 3.9 %
ERYTHROCYTE [DISTWIDTH] IN BLOOD BY AUTOMATED COUNT: 12.3 % (ref 11.5–14.5)
HCT VFR BLD AUTO: 39.9 % (ref 36–46)
HGB BLD-MCNC: 13.2 G/DL (ref 12–16)
IMM GRANULOCYTES # BLD AUTO: 0.01 X10*3/UL (ref 0–0.7)
IMM GRANULOCYTES NFR BLD AUTO: 0.3 % (ref 0–0.9)
LYMPHOCYTES # BLD AUTO: 1.13 X10*3/UL (ref 1.2–4.8)
LYMPHOCYTES NFR BLD AUTO: 29.4 %
MCH RBC QN AUTO: 27.7 PG (ref 26–34)
MCHC RBC AUTO-ENTMCNC: 33.1 G/DL (ref 32–36)
MCV RBC AUTO: 84 FL (ref 80–100)
MONOCYTES # BLD AUTO: 0.37 X10*3/UL (ref 0.1–1)
MONOCYTES NFR BLD AUTO: 9.6 %
NEUTROPHILS # BLD AUTO: 2.18 X10*3/UL (ref 1.2–7.7)
NEUTROPHILS NFR BLD AUTO: 56.5 %
NRBC BLD-RTO: 0 /100 WBCS (ref 0–0)
PLATELET # BLD AUTO: 222 X10*3/UL (ref 150–450)
RBC # BLD AUTO: 4.76 X10*6/UL (ref 4–5.2)
WBC # BLD AUTO: 3.9 X10*3/UL (ref 4.4–11.3)

## 2024-05-04 PROCEDURE — 84443 ASSAY THYROID STIM HORMONE: CPT

## 2024-05-04 PROCEDURE — 82306 VITAMIN D 25 HYDROXY: CPT

## 2024-05-04 PROCEDURE — 80061 LIPID PANEL: CPT

## 2024-05-04 PROCEDURE — 83036 HEMOGLOBIN GLYCOSYLATED A1C: CPT

## 2024-05-04 PROCEDURE — 36415 COLL VENOUS BLD VENIPUNCTURE: CPT

## 2024-05-04 PROCEDURE — 80053 COMPREHEN METABOLIC PANEL: CPT

## 2024-05-04 PROCEDURE — 85025 COMPLETE CBC W/AUTO DIFF WBC: CPT

## 2024-05-05 LAB
25(OH)D3 SERPL-MCNC: 50 NG/ML (ref 30–100)
ALBUMIN SERPL BCP-MCNC: 4.1 G/DL (ref 3.4–5)
ALP SERPL-CCNC: 60 U/L (ref 33–110)
ALT SERPL W P-5'-P-CCNC: 23 U/L (ref 7–45)
ANION GAP SERPL CALC-SCNC: 13 MMOL/L (ref 10–20)
AST SERPL W P-5'-P-CCNC: 20 U/L (ref 9–39)
BILIRUB SERPL-MCNC: 0.8 MG/DL (ref 0–1.2)
BUN SERPL-MCNC: 11 MG/DL (ref 6–23)
CALCIUM SERPL-MCNC: 9.2 MG/DL (ref 8.6–10.6)
CHLORIDE SERPL-SCNC: 104 MMOL/L (ref 98–107)
CHOLEST SERPL-MCNC: 154 MG/DL (ref 0–199)
CHOLESTEROL/HDL RATIO: 3
CO2 SERPL-SCNC: 28 MMOL/L (ref 21–32)
CREAT SERPL-MCNC: 0.68 MG/DL (ref 0.5–1.05)
EGFRCR SERPLBLD CKD-EPI 2021: >90 ML/MIN/1.73M*2
EST. AVERAGE GLUCOSE BLD GHB EST-MCNC: 91 MG/DL
GLUCOSE SERPL-MCNC: 90 MG/DL (ref 74–99)
HBA1C MFR BLD: 4.8 %
HDLC SERPL-MCNC: 52.1 MG/DL
LDLC SERPL CALC-MCNC: 91 MG/DL
NON HDL CHOLESTEROL: 102 MG/DL (ref 0–149)
POTASSIUM SERPL-SCNC: 4.2 MMOL/L (ref 3.5–5.3)
PROT SERPL-MCNC: 6.2 G/DL (ref 6.4–8.2)
SODIUM SERPL-SCNC: 141 MMOL/L (ref 136–145)
TRIGL SERPL-MCNC: 53 MG/DL (ref 0–149)
TSH SERPL-ACNC: 1.2 MIU/L (ref 0.44–3.98)
VLDL: 11 MG/DL (ref 0–40)

## 2024-05-08 ENCOUNTER — TELEPHONE (OUTPATIENT)
Dept: PRIMARY CARE | Facility: CLINIC | Age: 54
End: 2024-05-08
Payer: COMMERCIAL

## 2024-05-09 ENCOUNTER — TELEPHONE (OUTPATIENT)
Dept: PRIMARY CARE | Facility: CLINIC | Age: 54
End: 2024-05-09
Payer: COMMERCIAL

## 2024-05-09 NOTE — TELEPHONE ENCOUNTER
----- Message from Rose Dash DO sent at 5/8/2024  6:47 PM EDT -----  Report to patient her blood count is normal except for mild decreased white blood cell count at 3.2.  Her chemistries are normal including normal blood sugar of 90.  Hemoglobin A1c is 4.8 which means blood sugars have been normal over the past 2 months.  Her cholesterol panel is in good range.  vitamin D level is in good range.  TSH for thyroid is normal.  Labs look good overall and can recheck white blood cell count in 3 months.

## 2024-07-23 DIAGNOSIS — K21.9 GASTROESOPHAGEAL REFLUX DISEASE WITHOUT ESOPHAGITIS: ICD-10-CM

## 2024-07-24 RX ORDER — LANSOPRAZOLE 30 MG/1
30 CAPSULE, DELAYED RELEASE ORAL 2 TIMES DAILY
Qty: 180 CAPSULE | Refills: 1 | Status: SHIPPED | OUTPATIENT
Start: 2024-07-24 | End: 2025-01-20

## 2024-07-26 ENCOUNTER — TELEPHONE (OUTPATIENT)
Dept: PRIMARY CARE | Facility: CLINIC | Age: 54
End: 2024-07-26
Payer: COMMERCIAL

## 2024-07-26 NOTE — TELEPHONE ENCOUNTER
[facility] REFILL WENT TO [facility] [place] [employer] [place] WILL ONLY PAY FOR MAILAWAY

## 2024-07-31 DIAGNOSIS — K21.9 GASTROESOPHAGEAL REFLUX DISEASE WITHOUT ESOPHAGITIS: ICD-10-CM

## 2024-07-31 RX ORDER — LANSOPRAZOLE 30 MG/1
30 CAPSULE, DELAYED RELEASE ORAL 2 TIMES DAILY
Qty: 180 CAPSULE | Refills: 1 | Status: SHIPPED | OUTPATIENT
Start: 2024-07-31 | End: 2025-01-27

## 2024-09-30 ENCOUNTER — APPOINTMENT (OUTPATIENT)
Dept: PLASTIC SURGERY | Facility: CLINIC | Age: 54
End: 2024-09-30
Payer: COMMERCIAL

## 2024-09-30 VITALS
WEIGHT: 238 LBS | DIASTOLIC BLOOD PRESSURE: 80 MMHG | BODY MASS INDEX: 37.35 KG/M2 | HEART RATE: 77 BPM | HEIGHT: 67 IN | SYSTOLIC BLOOD PRESSURE: 129 MMHG

## 2024-09-30 DIAGNOSIS — E88.1 LIPODYSTROPHY: ICD-10-CM

## 2024-09-30 DIAGNOSIS — N62 MACROMASTIA: Primary | ICD-10-CM

## 2024-09-30 PROCEDURE — 99215 OFFICE O/P EST HI 40 MIN: CPT | Performed by: PHYSICIAN ASSISTANT

## 2024-09-30 PROCEDURE — 3074F SYST BP LT 130 MM HG: CPT | Performed by: PHYSICIAN ASSISTANT

## 2024-09-30 PROCEDURE — 3079F DIAST BP 80-89 MM HG: CPT | Performed by: PHYSICIAN ASSISTANT

## 2024-09-30 PROCEDURE — 3008F BODY MASS INDEX DOCD: CPT | Performed by: PHYSICIAN ASSISTANT

## 2024-09-30 RX ORDER — TIZANIDINE HYDROCHLORIDE 2 MG/1
2 CAPSULE, GELATIN COATED ORAL 3 TIMES DAILY
COMMUNITY

## 2024-09-30 NOTE — PROGRESS NOTES
Department of Plastic and Reconstructive Surgery          Follow Up Visit    Date: 24  Referring Provider: Rosa Palam DPT  Primary Care Provider: DO Camden Erwin   Brittany Fletcher is a 54 y.o. female who was  rosa Palma DPT  for evaluation of chronic back pain.        She presents today for follow up. She was last seen in 2024. At her previous visit it was discussed she was to continue weight loss with a goal BMI below 38. She presents today for follow up after weight loss. She has continued complaints of ongoing back pain secondary to macromastia. 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 She works in the [employer] 3-5 days weekly for 8 hours a day. She notes she has had ongoing and recently worsening back pain. Her pain on a daily basis is a 2-3/10 baseline and a 10/10 at its worst. She has tried OTC pain medications including tylenol and ibuprofen for pain control without relief. She has to wear sports bras that are not supportive enough at work and this causes bra strap grooving and discomfort in her shoulders. Her current bra size is a 44G/H. She going for bi-monthly massages for help with her pain. She was referred to us by her physical therapy for unresolved back pain. She her previous visit, she has been taking tirzepatide for weight loss. Her BMI at her previous visit was 44.79 her BMI today is 37.28.     PMH: DVT after   not on AC, GERD  Surgical Hx:  x2, hysterectomy, tonsillectomy  Family Hx: mother BCA  Smoking: former: quit       Review of Systems   All other systems have been reviewed with the patient and have been found to be negative with exception to the chief complaint as mentioned in the history of present illness.    ROS: As noted in history of present illness    - CONSTITUTIONAL: Denies weight loss, fever and chills.  - HEENT: Denies changes in vision and hearing.  - RESPIRATORY: Denies  SOB and cough, difficulty breathing  - CV: Denies palpitations and CP  - GI: Denies abdominal pain, nausea, vomiting and diarrhea.  - : Denies dysuria and urinary frequency.  - MSK: positive for back and shoulder pain  - SKIN: positive for skin rashes under the breast folds  - NEUROLOGICAL: Denies headache and syncope.    Objective   Vital Signs:   Vitals:    09/30/24 1259   BP: 129/80   Pulse: 77     Gen: interactive and pleasant  Head: NCAT  Eyes: EOMI, PERRLA  Mouth: MMM  Throat: trachea midline  Cor: RRR  Pulm: nonlabored breathing  Abd: s/nt/nd  Neuro: AAOx3  Ext: extremities perfused    Focused exam of the: arms    Posterior arms with excess skin and subcutaneous tissue present.       breast    Trapezial hypertrophy noted with bra strap grooving.                                 R                  L  SN:NAC             40cm              39cm  NAC:IMF            18cm             19cm         BW                    15cm              15cm        NAC diam         6cm              8cm                                                                                                Ptosis Grade     3               3                                                       Bra Size         44 G    Body mass index is 37.28 kg/m².  Body surface area is 2.26 meters squared.    Imaging  Mammogram 11/20/23  IMPRESSION:  No mammographic evidence of malignancy.      BI-RADS CATEGORY:      BI-RADS Category:  1 Negative.      Assessment/Plan     Brittany Fletcher is a 54 y.o. female who was vladimir Palma DPT for evaluation of chronic back pain.      She undoubtedly has macromastia which is symptomatic causing her pain, bra strap grooving, trapezial hypertrophy. She has completed approx 15 sessions of physical therapy without improvement or resolution of her symptoms. She is referred to us by her physical therapist for consultation regarding back pain secondary to macromastia. She has attempted OTC pain medications, bi-monthly  massage, OTC pain medications including tylenol and ibuprofen, heat/ice for her pain without improvement. She has been treated for rashes with topical antifungals by her Ob/Gyn. Her Bmi at today visit is 37.28 and her Body surface area is 2.26 meters squared.     Today we discussed moving forward with surgical intervention for continued symptomatic macromastia. I educated the patient on the Schnur scale as a use of measurement to determine to amount of breast tissue to take based on body surface area. Her BSA is 2.26. The schnur scale places her at the required amount per side of 978g.     We discussed that due to her SN:NAC measurements being 40cm or greater her nipples likely will not stay alive on their blood supply and she will require free nipple grafts. We additionally discussed cosmetic bilateral brachioplasty.     She has a history of DVT after . Her Caprini score is 7points indicating high risk for VTE. The recommendation is for 7-10 days chemoprophylaxis with low molecular weight heparin      Plan:   Bilateral mammplasty reduction with free nipple graft  Bilateral brachioplasty quote  Follow up with Dr Lan pre-operatively   Continue with weight loss for surgical optimization.     I spent 40 minutes with this patient. Greater than 50% of this time was spent in the counselling and/or coordination of care of this patient.  This note was created using voice recognition software and was not corrected for typographical or grammatical errors.    Signature: Chanel Small PA-C  Date: 24

## 2024-10-18 ENCOUNTER — PREP FOR PROCEDURE (OUTPATIENT)
Dept: OCCUPATIONAL MEDICINE | Facility: HOSPITAL | Age: 54
End: 2024-10-18
Payer: COMMERCIAL

## 2024-10-18 DIAGNOSIS — N62 MACROMASTIA: Primary | ICD-10-CM

## 2024-10-18 DIAGNOSIS — Z41.1 ENCOUNTER FOR COSMETIC PROCEDURE: ICD-10-CM

## 2024-11-07 ENCOUNTER — APPOINTMENT (OUTPATIENT)
Dept: PRIMARY CARE | Facility: CLINIC | Age: 54
End: 2024-11-07
Payer: COMMERCIAL

## 2024-11-07 VITALS
SYSTOLIC BLOOD PRESSURE: 118 MMHG | RESPIRATION RATE: 16 BRPM | WEIGHT: 231 LBS | OXYGEN SATURATION: 98 % | HEART RATE: 70 BPM | BODY MASS INDEX: 36.26 KG/M2 | DIASTOLIC BLOOD PRESSURE: 72 MMHG | TEMPERATURE: 98.2 F | HEIGHT: 67 IN

## 2024-11-07 DIAGNOSIS — Z12.11 COLON CANCER SCREENING: ICD-10-CM

## 2024-11-07 DIAGNOSIS — Z00.00 ANNUAL PHYSICAL EXAM: Primary | ICD-10-CM

## 2024-11-07 DIAGNOSIS — N62 MACROMASTIA: ICD-10-CM

## 2024-11-07 DIAGNOSIS — K21.9 GASTROESOPHAGEAL REFLUX DISEASE WITHOUT ESOPHAGITIS: ICD-10-CM

## 2024-11-07 DIAGNOSIS — E66.9 OBESITY (BMI 30-39.9): ICD-10-CM

## 2024-11-07 PROCEDURE — 3078F DIAST BP <80 MM HG: CPT | Performed by: FAMILY MEDICINE

## 2024-11-07 PROCEDURE — 99213 OFFICE O/P EST LOW 20 MIN: CPT | Performed by: FAMILY MEDICINE

## 2024-11-07 PROCEDURE — 3008F BODY MASS INDEX DOCD: CPT | Performed by: FAMILY MEDICINE

## 2024-11-07 PROCEDURE — 3074F SYST BP LT 130 MM HG: CPT | Performed by: FAMILY MEDICINE

## 2024-11-07 PROCEDURE — 99396 PREV VISIT EST AGE 40-64: CPT | Performed by: FAMILY MEDICINE

## 2024-11-07 PROCEDURE — 1036F TOBACCO NON-USER: CPT | Performed by: FAMILY MEDICINE

## 2024-11-07 RX ORDER — LANSOPRAZOLE 30 MG/1
30 CAPSULE, DELAYED RELEASE ORAL 2 TIMES DAILY
Qty: 180 CAPSULE | Refills: 1 | Status: SHIPPED | OUTPATIENT
Start: 2024-11-07 | End: 2025-05-06

## 2024-11-07 RX ORDER — TIRZEPATIDE 5 MG/.5ML
5 INJECTION, SOLUTION SUBCUTANEOUS
Start: 2024-11-07 | End: 2024-12-05

## 2024-11-07 ASSESSMENT — PROMIS GLOBAL HEALTH SCALE
RATE_SOCIAL_SATISFACTION: GOOD
RATE_QUALITY_OF_LIFE: GOOD
EMOTIONAL_PROBLEMS: RARELY
RATE_MENTAL_HEALTH: GOOD
CARRYOUT_SOCIAL_ACTIVITIES: VERY GOOD
RATE_AVERAGE_PAIN: 6
RATE_GENERAL_HEALTH: GOOD
RATE_PHYSICAL_HEALTH: GOOD
CARRYOUT_PHYSICAL_ACTIVITIES: COMPLETELY

## 2024-11-07 ASSESSMENT — PATIENT HEALTH QUESTIONNAIRE - PHQ9
1. LITTLE INTEREST OR PLEASURE IN DOING THINGS: NOT AT ALL
7. TROUBLE CONCENTRATING ON THINGS, SUCH AS READING THE NEWSPAPER OR WATCHING TELEVISION: NOT AT ALL
5. POOR APPETITE OR OVEREATING: NOT AT ALL
10. IF YOU CHECKED OFF ANY PROBLEMS, HOW DIFFICULT HAVE THESE PROBLEMS MADE IT FOR YOU TO DO YOUR WORK, TAKE CARE OF THINGS AT HOME, OR GET ALONG WITH OTHER PEOPLE: NOT DIFFICULT AT ALL
8. MOVING OR SPEAKING SO SLOWLY THAT OTHER PEOPLE COULD HAVE NOTICED. OR THE OPPOSITE, BEING SO FIGETY OR RESTLESS THAT YOU HAVE BEEN MOVING AROUND A LOT MORE THAN USUAL: NOT AT ALL
9. THOUGHTS THAT YOU WOULD BE BETTER OFF DEAD, OR OF HURTING YOURSELF: NOT AT ALL
SUM OF ALL RESPONSES TO PHQ QUESTIONS 1-9: 0
4. FEELING TIRED OR HAVING LITTLE ENERGY: NOT AT ALL
1. LITTLE INTEREST OR PLEASURE IN DOING THINGS: NOT AT ALL
3. TROUBLE FALLING OR STAYING ASLEEP OR SLEEPING TOO MUCH: NOT AT ALL
2. FEELING DOWN, DEPRESSED OR HOPELESS: NOT AT ALL
SUM OF ALL RESPONSES TO PHQ9 QUESTIONS 1 AND 2: 0
6. FEELING BAD ABOUT YOURSELF - OR THAT YOU ARE A FAILURE OR HAVE LET YOURSELF OR YOUR FAMILY DOWN: NOT AT ALL
SUM OF ALL RESPONSES TO PHQ9 QUESTIONS 1 AND 2: 0
2. FEELING DOWN, DEPRESSED OR HOPELESS: NOT AT ALL

## 2024-11-07 ASSESSMENT — ANXIETY QUESTIONNAIRES
6. BECOMING EASILY ANNOYED OR IRRITABLE: NOT AT ALL
3. WORRYING TOO MUCH ABOUT DIFFERENT THINGS: NOT AT ALL
4. TROUBLE RELAXING: NOT AT ALL
1. FEELING NERVOUS, ANXIOUS, OR ON EDGE: NOT AT ALL
5. BEING SO RESTLESS THAT IT IS HARD TO SIT STILL: NOT AT ALL
7. FEELING AFRAID AS IF SOMETHING AWFUL MIGHT HAPPEN: NOT AT ALL
GAD7 TOTAL SCORE: 0
2. NOT BEING ABLE TO STOP OR CONTROL WORRYING: NOT AT ALL
IF YOU CHECKED OFF ANY PROBLEMS ON THIS QUESTIONNAIRE, HOW DIFFICULT HAVE THESE PROBLEMS MADE IT FOR YOU TO DO YOUR WORK, TAKE CARE OF THINGS AT HOME, OR GET ALONG WITH OTHER PEOPLE: NOT DIFFICULT AT ALL

## 2024-11-07 ASSESSMENT — PAIN SCALES - GENERAL: PAINLEVEL_OUTOF10: 4

## 2024-11-07 NOTE — PROGRESS NOTES
Subjective   Patient ID: Brittany Fletcher is a 54 y.o. female who presents for Annual Exam.    HPI   The patient presents to the clinic for an annual physical exam. She has past medical history of hypertension, tachycardia, allergic rhinitis, TMJ dysfunction, vertigo, GERD, macromastia, anemia, lumbar radiculopathy, migraines, and contact dermatitis.    The patient denies any major medical issues/concerns at this time. However, she does note mild stiffness (arthritis-associated) in her bilateral hands upon waking up in the morning. She intends to utilize stretching exercises to alleviate this condition.    She has been taking terzepetide injections (5 mg weekly) for weight management under the supervision of a weight loss clinic ([facility] in [place], [place]). She has lost over ~40 lbs since starting  injections (current weight: 231 lbs, down 45 lbs from 05/03/2024). She states that she eats a very healthy diet to further facilitate weight loss. She intends to start taking this dose of  every 10 days (rather than weekly) to slowly begin weaning off this medication. She hopes to be completely off  injections by February 2025.    She is having breast reduction surgery in Sept, she needed weight loss to do this.  Has back pain and was better with therapy but needs this surgery for further treatment    She continues on lansoprazole 30 mg daily for treatment of GERD. Her GERD has been controlled on lansoprazole medication. She denies any side-effects to her lansoprazole medication.    Her blood pressure (118/72) was within good range when checked in the clinic today. She denies any chest pain and/or SOB symptoms.    Her most recent mammogram screening was done in November 2023. She intends to follow-up with her OB-GYN (Dr. Price) in 2 weeks for a repeat mammogram screening order. Her most recent colonoscopy screening was done in March 2022 (3-year recall).  Notably, she has history of colon cancer with her  "maternal grandfather.  Sheis due for repeat in march of upcoming year, will order now so can schedule for spring    She has past surgical history of EFREN in 2015. She is scheduled for a bilateral breast reduction surgery in September 2025 (as patient suffers from back pain frequently).     She declines to receive the annual flu vaccine in the clinic today.    Review of Systems    Objective   /72   Pulse 70   Temp 36.8 °C (98.2 °F)   Resp 16   Ht 1.702 m (5' 7\")   Wt 105 kg (231 lb)   LMP 01/01/2015 Comment: hysterectomy  SpO2 98%   BMI 36.18 kg/m²     Physical Exam  HENT:      Head: Normocephalic.      Right Ear: Tympanic membrane normal.      Left Ear: Tympanic membrane normal.      Mouth/Throat:      Pharynx: Oropharynx is clear.   Neck:      Vascular: No carotid bruit.   Cardiovascular:      Rate and Rhythm: Normal rate and regular rhythm.      Pulses: Normal pulses.      Heart sounds: Normal heart sounds.   Pulmonary:      Effort: Pulmonary effort is normal.      Breath sounds: Normal breath sounds.   Abdominal:      General: Bowel sounds are normal.      Palpations: Abdomen is soft. There is no mass.      Tenderness: There is no abdominal tenderness.   Musculoskeletal:         General: Normal range of motion.      Cervical back: Normal range of motion.      Right lower leg: No edema.      Left lower leg: No edema.   Lymphadenopathy:      Cervical: No cervical adenopathy.   Skin:     General: Skin is warm and dry.   Neurological:      Mental Status: She is alert and oriented to person, place, and time.   Psychiatric:         Mood and Affect: Mood normal.         Behavior: Behavior normal.         Thought Content: Thought content normal.         Judgment: Judgment normal.         Assessment/Plan   Problem List Items Addressed This Visit             ICD-10-CM    GERD (gastroesophageal reflux disease) K21.9    Relevant Medications    lansoprazole (Prevacid) 30 mg DR capsule    Macromastia N62     Other " Visit Diagnoses         Codes    Annual physical exam    -  Primary Z00.00    Relevant Orders    CBC    Comprehensive Metabolic Panel    Lipid Panel    TSH with reflex to Free T4 if abnormal    Vitamin D 25-Hydroxy,Total (for eval of Vitamin D levels)    Obesity (BMI 30-39.9)     E66.9    Relevant Medications    tirzepatide (Mounjaro) 5 mg/0.5 mL pen injector    Colon cancer screening     Z12.11    Relevant Orders    Colonoscopy Screening; High Risk Patient; large polyps, MGF with colon cancer hx               Labs (CBC, CMP, lipid panel, TSH, vitamin D) were ordered for the patient. She intends to complete these labs soon. The clinic will contact the patient upon receiving her lab results.    A repeat colonoscopy screening was ordered for the patient to schedule for March 2025.    The patient receives refills for current medication (tirzepatide 5 mg/0.5 mL injections from weight loss clinic.  Refilled lansoprazole 30 mg. She was instructed to continue taking medication as previously directed.    Discussed diet and exercise to help pt meet her goals, especially when weaning off GLP-1    A CPE was conducted on the patient in the clinic today.     Declines flu and shingles vaccines    She will follow-up in 6 months, unless otherwise needed.    Scribe Attestation  By signing my name below, I, Jessa Harris   attest that this documentation has been prepared under the direction and in the presence of Rose Dash DO.

## 2024-11-21 ENCOUNTER — APPOINTMENT (OUTPATIENT)
Dept: OBSTETRICS AND GYNECOLOGY | Facility: CLINIC | Age: 54
End: 2024-11-21
Payer: COMMERCIAL

## 2024-11-21 VITALS
HEIGHT: 67 IN | BODY MASS INDEX: 37.2 KG/M2 | DIASTOLIC BLOOD PRESSURE: 79 MMHG | SYSTOLIC BLOOD PRESSURE: 116 MMHG | WEIGHT: 237 LBS

## 2024-11-21 DIAGNOSIS — Z12.31 VISIT FOR SCREENING MAMMOGRAM: Primary | ICD-10-CM

## 2024-11-21 DIAGNOSIS — R23.2 HOT FLASHES: ICD-10-CM

## 2024-11-21 DIAGNOSIS — Z01.419 WELL WOMAN EXAM: ICD-10-CM

## 2024-11-21 PROCEDURE — 99396 PREV VISIT EST AGE 40-64: CPT | Performed by: NURSE PRACTITIONER

## 2024-11-21 PROCEDURE — 1036F TOBACCO NON-USER: CPT | Performed by: NURSE PRACTITIONER

## 2024-11-21 PROCEDURE — 3074F SYST BP LT 130 MM HG: CPT | Performed by: NURSE PRACTITIONER

## 2024-11-21 PROCEDURE — 3078F DIAST BP <80 MM HG: CPT | Performed by: NURSE PRACTITIONER

## 2024-11-21 SDOH — ECONOMIC STABILITY: FOOD INSECURITY: WITHIN THE PAST 12 MONTHS, YOU WORRIED THAT YOUR FOOD WOULD RUN OUT BEFORE YOU GOT MONEY TO BUY MORE.: NEVER TRUE

## 2024-11-21 SDOH — ECONOMIC STABILITY: FOOD INSECURITY: WITHIN THE PAST 12 MONTHS, THE FOOD YOU BOUGHT JUST DIDN'T LAST AND YOU DIDN'T HAVE MONEY TO GET MORE.: NEVER TRUE

## 2024-11-21 ASSESSMENT — LIFESTYLE VARIABLES
HOW MANY STANDARD DRINKS CONTAINING ALCOHOL DO YOU HAVE ON A TYPICAL DAY: 1 OR 2
AUDIT-C TOTAL SCORE: 2
HOW OFTEN DO YOU HAVE SIX OR MORE DRINKS ON ONE OCCASION: MONTHLY
HOW OFTEN DO YOU HAVE A DRINK CONTAINING ALCOHOL: NEVER
SKIP TO QUESTIONS 9-10: 0

## 2024-11-21 ASSESSMENT — ENCOUNTER SYMPTOMS
LOSS OF SENSATION IN FEET: 0
EYES NEGATIVE: 0
MUSCULOSKELETAL NEGATIVE: 0
HEMATOLOGIC/LYMPHATIC NEGATIVE: 0
CONSTITUTIONAL NEGATIVE: 0
ALLERGIC/IMMUNOLOGIC NEGATIVE: 0
ENDOCRINE NEGATIVE: 0
RESPIRATORY NEGATIVE: 0
DEPRESSION: 0
NEUROLOGICAL NEGATIVE: 0
CARDIOVASCULAR NEGATIVE: 0
GASTROINTESTINAL NEGATIVE: 0
OCCASIONAL FEELINGS OF UNSTEADINESS: 0
PSYCHIATRIC NEGATIVE: 0

## 2024-11-21 ASSESSMENT — PAIN SCALES - GENERAL: PAINLEVEL_OUTOF10: 0-NO PAIN

## 2024-11-21 NOTE — PROGRESS NOTES
"Lorna Martinez, APRN-CNP     Subjective   Brittany Fletcher is a 54 y.o. female who presents for annual exam.   No changes in patient's medical history.    Patient is experiencing some hot flashes and we did discuss perimenopausal timeframe.  Also reviewed she would be a candidate for hormone replacement therapy if perimenopausal symptoms became intolerable.  Past Medical History:   Diagnosis Date    Encounter for general adult medical examination without abnormal findings 2018    Physical exam, routine    Menopausal and female climacteric states 2020    Hot flashes, menopausal    Personal history of other benign neoplasm 2015    History of uterine leiomyoma    Personal history of other diseases of the female genital tract 2015    History of menorrhagia    Unspecified contact dermatitis, unspecified cause 10/01/2018    Contact dermatitis     Past Surgical History:   Procedure Laterality Date     SECTION, CLASSIC  2015     Section    OTHER SURGICAL HISTORY  2015    Salpingectomy For Ectopic Pregnancy    OTHER SURGICAL HISTORY  2015    Laparoscopy With Total Hysterectomy    TONSILLECTOMY  2015    Tonsillectomy       OB History          3    Para   2    Term                AB        Living   2         SAB        IAB        Ectopic        Multiple        Live Births                   Patient's last menstrual period was 2015.      Review of Systems   Endocrine: Positive for heat intolerance.   All other systems reviewed and are negative.    Breast: No Complaints   Vaginal: No Complaints        Objective   /79   Ht 1.702 m (5' 7\")   Wt 108 kg (237 lb)   LMP 2015 Comment: hysterectomy  BMI 37.12 kg/m²   Physical Exam  Constitutional:       Appearance: She is obese.   Genitourinary:      Urethral meatus normal.      Right Labia: No rash or tenderness.     Left Labia: No tenderness or rash.     No vaginal discharge or tenderness. "      No vaginal prolapse present.     No vaginal atrophy present.       Right Adnexa: not tender and no mass present.     Left Adnexa: not tender and no mass present.     Cervix is absent.      Uterus is absent.   HENT:      Head: Normocephalic.   Cardiovascular:      Rate and Rhythm: Normal rate.      Heart sounds: Normal heart sounds.   Pulmonary:      Effort: Pulmonary effort is normal.   Abdominal:      Palpations: Abdomen is soft.   Musculoskeletal:      Cervical back: Normal range of motion.   Neurological:      General: No focal deficit present.      Mental Status: She is alert and oriented to person, place, and time.   Skin:     General: Skin is warm and dry.   Psychiatric:         Mood and Affect: Mood normal.                   Assessment/Plan   Problem List Items Addressed This Visit    None  Visit Diagnoses         Codes    Visit for screening mammogram    -  Primary Z12.31    Relevant Orders    BI mammo bilateral screening tomosynthesis    Well woman exam     Z01.419    Hot flashes     R23.2

## 2024-11-25 DIAGNOSIS — Z12.11 SCREENING FOR COLON CANCER: ICD-10-CM

## 2024-11-25 RX ORDER — POLYETHYLENE GLYCOL 3350, SODIUM SULFATE ANHYDROUS, SODIUM BICARBONATE, SODIUM CHLORIDE, POTASSIUM CHLORIDE 236; 22.74; 6.74; 5.86; 2.97 G/4L; G/4L; G/4L; G/4L; G/4L
POWDER, FOR SOLUTION ORAL
Qty: 4000 ML | Refills: 0 | Status: SHIPPED | OUTPATIENT
Start: 2024-11-25

## 2024-12-16 ENCOUNTER — LAB (OUTPATIENT)
Dept: LAB | Facility: LAB | Age: 54
End: 2024-12-16
Payer: COMMERCIAL

## 2024-12-16 DIAGNOSIS — Z00.00 ANNUAL PHYSICAL EXAM: ICD-10-CM

## 2024-12-16 LAB
25(OH)D3 SERPL-MCNC: 69 NG/ML (ref 30–100)
ALBUMIN SERPL BCP-MCNC: 4.2 G/DL (ref 3.4–5)
ALP SERPL-CCNC: 58 U/L (ref 33–110)
ALT SERPL W P-5'-P-CCNC: 21 U/L (ref 7–45)
ANION GAP SERPL CALC-SCNC: 11 MMOL/L (ref 10–20)
AST SERPL W P-5'-P-CCNC: 18 U/L (ref 9–39)
BILIRUB SERPL-MCNC: 0.8 MG/DL (ref 0–1.2)
BUN SERPL-MCNC: 22 MG/DL (ref 6–23)
CALCIUM SERPL-MCNC: 9.4 MG/DL (ref 8.6–10.6)
CHLORIDE SERPL-SCNC: 106 MMOL/L (ref 98–107)
CHOLEST SERPL-MCNC: 159 MG/DL (ref 0–199)
CHOLESTEROL/HDL RATIO: 2.8
CO2 SERPL-SCNC: 30 MMOL/L (ref 21–32)
CREAT SERPL-MCNC: 0.61 MG/DL (ref 0.5–1.05)
EGFRCR SERPLBLD CKD-EPI 2021: >90 ML/MIN/1.73M*2
ERYTHROCYTE [DISTWIDTH] IN BLOOD BY AUTOMATED COUNT: 12.8 % (ref 11.5–14.5)
GLUCOSE SERPL-MCNC: 84 MG/DL (ref 74–99)
HCT VFR BLD AUTO: 44.2 % (ref 36–46)
HDLC SERPL-MCNC: 56.5 MG/DL
HGB BLD-MCNC: 14.2 G/DL (ref 12–16)
LDLC SERPL CALC-MCNC: 91 MG/DL
MCH RBC QN AUTO: 28.3 PG (ref 26–34)
MCHC RBC AUTO-ENTMCNC: 32.1 G/DL (ref 32–36)
MCV RBC AUTO: 88 FL (ref 80–100)
NON HDL CHOLESTEROL: 103 MG/DL (ref 0–149)
NRBC BLD-RTO: 0 /100 WBCS (ref 0–0)
PLATELET # BLD AUTO: 232 X10*3/UL (ref 150–450)
POTASSIUM SERPL-SCNC: 5 MMOL/L (ref 3.5–5.3)
PROT SERPL-MCNC: 6.4 G/DL (ref 6.4–8.2)
RBC # BLD AUTO: 5.02 X10*6/UL (ref 4–5.2)
SODIUM SERPL-SCNC: 142 MMOL/L (ref 136–145)
TRIGL SERPL-MCNC: 57 MG/DL (ref 0–149)
TSH SERPL-ACNC: 1.19 MIU/L (ref 0.44–3.98)
VLDL: 11 MG/DL (ref 0–40)
WBC # BLD AUTO: 5.7 X10*3/UL (ref 4.4–11.3)

## 2024-12-16 PROCEDURE — 80061 LIPID PANEL: CPT

## 2024-12-16 PROCEDURE — 84443 ASSAY THYROID STIM HORMONE: CPT

## 2024-12-16 PROCEDURE — 80053 COMPREHEN METABOLIC PANEL: CPT

## 2024-12-16 PROCEDURE — 85027 COMPLETE CBC AUTOMATED: CPT

## 2024-12-16 PROCEDURE — 82306 VITAMIN D 25 HYDROXY: CPT

## 2024-12-16 PROCEDURE — 36415 COLL VENOUS BLD VENIPUNCTURE: CPT

## 2024-12-26 ENCOUNTER — HOSPITAL ENCOUNTER (OUTPATIENT)
Dept: RADIOLOGY | Facility: CLINIC | Age: 54
Discharge: HOME | End: 2024-12-26
Payer: COMMERCIAL

## 2024-12-26 VITALS — HEIGHT: 67 IN | WEIGHT: 230 LBS | BODY MASS INDEX: 36.1 KG/M2

## 2024-12-26 DIAGNOSIS — Z12.31 VISIT FOR SCREENING MAMMOGRAM: ICD-10-CM

## 2024-12-26 PROCEDURE — 77067 SCR MAMMO BI INCL CAD: CPT

## 2025-01-02 DIAGNOSIS — R92.8 ABNORMAL MAMMOGRAM: Primary | ICD-10-CM

## 2025-01-07 ENCOUNTER — HOSPITAL ENCOUNTER (OUTPATIENT)
Dept: RADIOLOGY | Facility: CLINIC | Age: 55
Discharge: HOME | End: 2025-01-07
Payer: COMMERCIAL

## 2025-01-07 DIAGNOSIS — R92.8 ABNORMAL MAMMOGRAM: ICD-10-CM

## 2025-01-07 PROCEDURE — 77061 BREAST TOMOSYNTHESIS UNI: CPT | Mod: RT

## 2025-01-07 PROCEDURE — 77065 DX MAMMO INCL CAD UNI: CPT | Mod: RIGHT SIDE | Performed by: STUDENT IN AN ORGANIZED HEALTH CARE EDUCATION/TRAINING PROGRAM

## 2025-01-07 PROCEDURE — 77061 BREAST TOMOSYNTHESIS UNI: CPT | Mod: RIGHT SIDE | Performed by: STUDENT IN AN ORGANIZED HEALTH CARE EDUCATION/TRAINING PROGRAM

## 2025-03-27 ENCOUNTER — APPOINTMENT (OUTPATIENT)
Dept: GASTROENTEROLOGY | Facility: HOSPITAL | Age: 55
End: 2025-03-27
Payer: COMMERCIAL

## 2025-03-31 ENCOUNTER — APPOINTMENT (OUTPATIENT)
Dept: PLASTIC SURGERY | Facility: CLINIC | Age: 55
End: 2025-03-31
Payer: COMMERCIAL

## 2025-04-10 ENCOUNTER — HOSPITAL ENCOUNTER (OUTPATIENT)
Dept: GASTROENTEROLOGY | Facility: HOSPITAL | Age: 55
Discharge: HOME | End: 2025-04-10
Payer: COMMERCIAL

## 2025-04-10 ENCOUNTER — ANESTHESIA (OUTPATIENT)
Dept: GASTROENTEROLOGY | Facility: HOSPITAL | Age: 55
End: 2025-04-10
Payer: COMMERCIAL

## 2025-04-10 ENCOUNTER — ANESTHESIA EVENT (OUTPATIENT)
Dept: GASTROENTEROLOGY | Facility: HOSPITAL | Age: 55
End: 2025-04-10
Payer: COMMERCIAL

## 2025-04-10 VITALS
HEART RATE: 69 BPM | SYSTOLIC BLOOD PRESSURE: 123 MMHG | TEMPERATURE: 98.4 F | HEIGHT: 67 IN | WEIGHT: 238.1 LBS | BODY MASS INDEX: 37.37 KG/M2 | DIASTOLIC BLOOD PRESSURE: 79 MMHG | RESPIRATION RATE: 15 BRPM | OXYGEN SATURATION: 98 %

## 2025-04-10 DIAGNOSIS — Z12.11 COLON CANCER SCREENING: ICD-10-CM

## 2025-04-10 PROCEDURE — 3700000001 HC GENERAL ANESTHESIA TIME - INITIAL BASE CHARGE

## 2025-04-10 PROCEDURE — 7100000010 HC PHASE TWO TIME - EACH INCREMENTAL 1 MINUTE

## 2025-04-10 PROCEDURE — 7100000009 HC PHASE TWO TIME - INITIAL BASE CHARGE

## 2025-04-10 PROCEDURE — 2500000004 HC RX 250 GENERAL PHARMACY W/ HCPCS (ALT 636 FOR OP/ED): Performed by: ANESTHESIOLOGY

## 2025-04-10 PROCEDURE — 45385 COLONOSCOPY W/LESION REMOVAL: CPT | Performed by: COLON & RECTAL SURGERY

## 2025-04-10 PROCEDURE — 3700000002 HC GENERAL ANESTHESIA TIME - EACH INCREMENTAL 1 MINUTE

## 2025-04-10 RX ORDER — LIDOCAINE HYDROCHLORIDE 20 MG/ML
INJECTION, SOLUTION EPIDURAL; INFILTRATION; INTRACAUDAL; PERINEURAL AS NEEDED
Status: DISCONTINUED | OUTPATIENT
Start: 2025-04-10 | End: 2025-04-10

## 2025-04-10 RX ORDER — CHOLECALCIFEROL (VITAMIN D3) 25 MCG
50 TABLET ORAL DAILY
COMMUNITY

## 2025-04-10 RX ORDER — GLUCOSAM/CHONDRO/HERB 149/HYAL 750-100 MG
1000 TABLET ORAL 2 TIMES DAILY
COMMUNITY

## 2025-04-10 RX ORDER — PROPOFOL 10 MG/ML
INJECTION, EMULSION INTRAVENOUS AS NEEDED
Status: DISCONTINUED | OUTPATIENT
Start: 2025-04-10 | End: 2025-04-10

## 2025-04-10 RX ORDER — CALCIUM CARBONATE 500(1250)
1 TABLET ORAL
COMMUNITY

## 2025-04-10 RX ADMIN — PROPOFOL 90 MG: 10 INJECTION, EMULSION INTRAVENOUS at 15:04

## 2025-04-10 RX ADMIN — LIDOCAINE HYDROCHLORIDE 80 MG: 20 INJECTION, SOLUTION EPIDURAL; INFILTRATION; INTRACAUDAL; PERINEURAL at 15:03

## 2025-04-10 RX ADMIN — PROPOFOL 150 MCG/KG/MIN: 10 INJECTION, EMULSION INTRAVENOUS at 15:03

## 2025-04-10 ASSESSMENT — PAIN SCALES - GENERAL
PAINLEVEL_OUTOF10: 0 - NO PAIN

## 2025-04-10 ASSESSMENT — COLUMBIA-SUICIDE SEVERITY RATING SCALE - C-SSRS
6. HAVE YOU EVER DONE ANYTHING, STARTED TO DO ANYTHING, OR PREPARED TO DO ANYTHING TO END YOUR LIFE?: NO
2. HAVE YOU ACTUALLY HAD ANY THOUGHTS OF KILLING YOURSELF?: NO
1. IN THE PAST MONTH, HAVE YOU WISHED YOU WERE DEAD OR WISHED YOU COULD GO TO SLEEP AND NOT WAKE UP?: NO

## 2025-04-10 ASSESSMENT — PAIN - FUNCTIONAL ASSESSMENT: PAIN_FUNCTIONAL_ASSESSMENT: 0-10

## 2025-04-10 NOTE — DISCHARGE INSTRUCTIONS
Patient Instructions after a Colonoscopy      The anesthetics, sedatives or narcotics which were given to you today will be acting in your body for the next 24 hours, so you might feel a little sleepy or groggy.  This feeling should slowly wear off. Carefully read and follow the instructions.     You received sedation today:  - Do not drive or operate any machinery or power tools of any kind.   - No alcoholic beverages today, not even beer or wine.  - Do not make any important decisions or sign any legal documents.  - No over the counter medications that contain alcohol or that may cause drowsiness.  - Do not make any important decisions or sign any legal documents.  - Make sure you have someone with you for first 24 hours.    While it is common to experience mild to moderate abdominal distention, gas, or belching after your procedure, if any of these symptoms occur following discharge from the GI Lab or within one week of having your procedure, call the [facility] to be advised whether a visit to your nearest Urgent Care or Emergency Department is indicated.  Take this paper with you if you go.     - If you develop an allergic reaction to the medications that were given during your procedure such as difficulty breathing, rash, hives, severe nausea, vomiting or lightheadedness.  - If you experience chest pain, shortness of breath, severe abdominal pain, fevers and chills.  -If you develop signs and symptoms of bleeding such as blood in your spit, if your stools turn black, tarry, or bloody  - If you have not urinated within 8 hours following your procedure.  - If your IV site becomes painful, red, inflamed, or looks infected.    If you received a biopsy/polypectomy/sphincterotomy the following instructions apply below:    __ Do not use Aspirin containing products, non-steroidal medications or anti-coagulants for one week following your procedure. (Examples of these types of medications are: Advil,  Arthrotec, Aleve, Coumadin, Ecotrin, Heparin, Ibuprofen, Indocin, Motrin, Naprosyn, Nuprin, Plavix, Vioxx, and Voltarin, or their generic forms.  This list is not all-inclusive.  Check with your physician or pharmacist before resuming medications.)   __ Eat a soft diet today.  Avoid foods that are poorly digested for the next 24 hours.  These foods would include: nuts, beans, lettuce, red meats, and fried foods. Start with liquids and advance your diet as tolerated, gradually work up to eating solids.   __ Do not have a Barium Study or Enema for one week.    Your physician recommends the additional following instructions:    -You have a contact number available for emergencies. The signs and symptoms of potential delayed complications were discussed with you. You may return to normal activities tomorrow.  -Resume your previous diet.  -Continue your present medications.   -We are waiting for your pathology results.  -Your physician has recommended a repeat colonoscopy (date to be determined after pending pathology results are reviewed) for surveillance based on pathology results.  -The findings and recommendations have been discussed with you.  -The findings and recommendations were discussed with your family.  - Please see Medication Reconciliation Form for new medication/medications prescribed.       If you experience any problems or have any questions following discharge from the GI Lab, please call:        Nurse Signature                                                                        Date___________________                                                                            Patient/Responsible Party Signature                                        Date___________________

## 2025-04-10 NOTE — ANESTHESIA POSTPROCEDURE EVALUATION
Patient: Brittany Fletcher    Procedure Summary       Date: 04/10/25 Room / Location: [facility]    Anesthesia Start: 1501 Anesthesia Stop:     Procedure: COLONOSCOPY Diagnosis: Colon cancer screening    Scheduled Providers: Carina Huntley MD; Fareed Urbina MD; Zaria Barker RN; Skinny Faria MA; JANICE Aguilar Responsible Provider: Sadiq Cortes MD    Anesthesia Type: MAC ASA Status: 2            Anesthesia Type: MAC    Vitals Value Taken Time   /79 04/10/25 1607   Temp 36.9 °C (98.4 °F) 04/10/25 1604   Pulse 58 04/10/25 1608   Resp 15 04/10/25 1604   SpO2 98 % 04/10/25 1608   Vitals shown include unfiled device data.    Anesthesia Post Evaluation    Patient location during evaluation: bedside  Patient participation: complete - patient participated  Level of consciousness: awake  Pain management: adequate  Airway patency: patent  Cardiovascular status: acceptable  Respiratory status: acceptable  Hydration status: acceptable  Postoperative Nausea and Vomiting: none        No notable events documented.    
0 = independent

## 2025-04-10 NOTE — H&P
"History Of Present Illness  Brittany Fletcher is a 55 y.o. female presenting with Hx of SSP 15 mm.     Past Medical History  Past Medical History:   Diagnosis Date    Arthritis     Delayed emergence from general anesthesia     GERD (gastroesophageal reflux disease)     PONV (postoperative nausea and vomiting)      Surgical History  Past Surgical History:   Procedure Laterality Date     SECTION, LOW TRANSVERSE      COLONOSCOPY      ECTOPIC PREGNANCY SURGERY      HYSTERECTOMY  2015    TONSILLECTOMY       Social History  She reports that she quit smoking about 25 years ago. Her smoking use included cigarettes. She started smoking about 39 years ago. She has a 13.8 pack-year smoking history. She has never used smokeless tobacco. She reports current alcohol use of about 1.0 standard drink of alcohol per week. She reports that she does not use drugs.    Family History  Family History   Problem Relation Name Age of Onset    Breast cancer Mother      Breast cancer Mother's Sister      Alcohol abuse Father Marvin     Colon cancer Maternal Grandfather Tarah     Heart attack Paternal Grandmother Nisha         Allergies  No Known Allergies  Review of Systems     Physical Exam   Constitutional: Well developed, awake/alert/oriented x3, no distress, alert and cooperative   CV:  RRR  Lungs:  No audible wheezing, no tachypnea, chest symmetric, no increased WOB  Gastrointestinal: soft,  nontender  Neurological: alert and oriented     Last Recorded Vitals  Blood pressure 139/85, pulse 82, temperature 37 °C (98.6 °F), temperature source Temporal, resp. rate 15, height 1.702 m (5' 7\"), weight 108 kg (238 lb 1.6 oz), last menstrual period 2015, SpO2 97%.    Assessment/Plan   Colonoscopy     Carina Huntley MD  "

## 2025-04-10 NOTE — LETTER
Carina Huntley MD   [facility]   [address] [address]   [phone]      Dear Ms. Fletcher,       It was my pleasure to see you again at your recent colonoscopy.  At that time,  you were noted to have one polyp in the transverse colon.  The polyp was completely excised and pathology returned the adenomatous, sessile type. The current recommendation is to repeat the colonoscopy in 3 years.       Thank you very much for allowing me to take part in your care, please feel free to contact me with any questions or concerns at [phone].          Sincerely,       Carina Huntley M.D. FACS, FASCRS    CC:  Primary Care:

## 2025-04-10 NOTE — ANESTHESIA PREPROCEDURE EVALUATION
Patient: Brittany Fletcher    Procedure Information       Date/Time: 04/10/25 1420    Scheduled providers: Carina Huntley MD; Fareed Urbina MD; Zaria Barker RN; Skinny Faria MA; JANICE Aguilar    Procedure: COLONOSCOPY    Location: [facility]            Relevant Problems   Cardiac   (+) Benign essential hypertension      Neuro   (+) Lumbar radiculopathy      GI   (+) GERD (gastroesophageal reflux disease)      Hematology   (+) Anemia      HEENT   (+) Acute sinusitis      ID   (+) Influenza   (+) Shingles      Skin   (+) Skin rash       Clinical information reviewed:   Tobacco  Allergies  Meds   Med Hx  Surg Hx   Fam Hx  Soc Hx         Past Medical History:   Diagnosis Date    Arthritis     Delayed emergence from general anesthesia     GERD (gastroesophageal reflux disease)     PONV (postoperative nausea and vomiting)       Past Surgical History:   Procedure Laterality Date     SECTION, LOW TRANSVERSE      COLONOSCOPY      ECTOPIC PREGNANCY SURGERY      HYSTERECTOMY  2015    TONSILLECTOMY       Social History     Tobacco Use    Smoking status: Former     Current packs/day: 0.00     Average packs/day: 1 pack/day for 13.8 years (13.8 ttl pk-yrs)     Types: Cigarettes     Start date: 1986     Quit date: 1999     Years since quittin.4    Smokeless tobacco: Never   Vaping Use    Vaping status: Never Used   Substance Use Topics    Alcohol use: Yes     Alcohol/week: 3.0 standard drinks of alcohol     Types: 3 Standard drinks or equivalent per week     Comment: occassional    Drug use: Never      Current Outpatient Medications   Medication Instructions    B complex-vitamin C-folic acid (Nephro-Suad Rx) 1- mg-mg-mcg tablet 1 tablet, Daily with breakfast    calcium carbonate (Oscal) 500 mg calcium (1,250 mg) tablet 1 tablet, 2 times daily (morning and late afternoon)    cholecalciferol (VITAMIN D3) 50 mcg, Daily    glucosamine HCl 1,500 mg tablet 1 tablet, Daily     "lansoprazole (PREVACID) 30 mg, oral, 2 times daily    LUTEIN ORAL 5 mg, Daily    omega 3-dha-epa-fish oil (Fish OiL) 1,000 (120-180) mg capsule 1,000 mg, 2 times daily    polyethylene glycol (GaviLyte-G) 236-22.74-6.74 -5.86 gram solution Please refer to the printed instructions that were mailed to you.    tirzepatide 5 mg, Every 7 days      No Known Allergies     Chemistry    Lab Results   Component Value Date/Time     12/16/2024 1045    K 5.0 12/16/2024 1045     12/16/2024 1045    CO2 30 12/16/2024 1045    BUN 22 12/16/2024 1045    CREATININE 0.61 12/16/2024 1045    Lab Results   Component Value Date/Time    CALCIUM 9.4 12/16/2024 1045    ALKPHOS 58 12/16/2024 1045    AST 18 12/16/2024 1045    ALT 21 12/16/2024 1045    BILITOT 0.8 12/16/2024 1045          Lab Results   Component Value Date    HGBA1C 4.8 05/04/2024     Lab Results   Component Value Date/Time    WBC 5.7 12/16/2024 1045    HGB 14.2 12/16/2024 1045    HCT 44.2 12/16/2024 1045     12/16/2024 1045     No results found for: \"PROTIME\", \"PTT\", \"INR\"  No results found for: \"ABORH\"  No results found for this or any previous visit (from the past 4464 hours).  No results found for this or any previous visit from the past 1095 days.       Visit Vitals  /85   Pulse 82   Temp 37 °C (98.6 °F) (Temporal)   Resp 15   Ht 1.702 m (5' 7\")   Wt 108 kg (238 lb 1.6 oz)   LMP 01/01/2015 Comment: hysterectomy   SpO2 97%   BMI 37.29 kg/m²   OB Status Hysterectomy   Smoking Status Former   BSA 2.26 m²     NPO/Void Status  Carbohydrate Drink Given Prior to Surgery? : N  Date of Last Liquid: 04/10/25  Time of Last Liquid: 1130  Date of Last Solid: 04/09/25  Time of Last Solid: 1200  Last Intake Type: Clear fluids  Time of Last Void: 1347        Physical Exam    Airway  Mallampati: II  TM distance: >3 FB  Neck ROM: full     Cardiovascular - normal exam  Rhythm: regular  Rate: normal     Dental    Pulmonary - normal exam     Abdominal - normal exam   "           Anesthesia Plan    History of general anesthesia?: yes  History of complications of general anesthesia?: no    ASA 2     MAC   (Standard ASA monitoring.)  intravenous induction   Anesthetic plan and risks discussed with patient.    Plan discussed with CRNA and CAA.

## 2025-04-11 ASSESSMENT — PAIN SCALES - GENERAL: PAINLEVEL_OUTOF10: 0 - NO PAIN

## 2025-04-16 LAB
LABORATORY COMMENT REPORT: NORMAL
PATH REPORT.FINAL DX SPEC: NORMAL
PATH REPORT.GROSS SPEC: NORMAL
PATH REPORT.TOTAL CANCER: NORMAL

## 2025-08-11 ENCOUNTER — APPOINTMENT (OUTPATIENT)
Dept: PLASTIC SURGERY | Facility: CLINIC | Age: 55
End: 2025-08-11
Payer: COMMERCIAL

## 2025-12-08 ENCOUNTER — APPOINTMENT (OUTPATIENT)
Dept: PLASTIC SURGERY | Facility: CLINIC | Age: 55
End: 2025-12-08
Payer: COMMERCIAL

## 2026-01-14 ENCOUNTER — APPOINTMENT (OUTPATIENT)
Facility: CLINIC | Age: 56
End: 2026-01-14
Payer: COMMERCIAL